# Patient Record
Sex: MALE | Race: OTHER | URBAN - METROPOLITAN AREA
[De-identification: names, ages, dates, MRNs, and addresses within clinical notes are randomized per-mention and may not be internally consistent; named-entity substitution may affect disease eponyms.]

---

## 2024-08-27 ENCOUNTER — INPATIENT (INPATIENT)
Facility: HOSPITAL | Age: 34
LOS: 7 days | Discharge: HOME | DRG: 182 | End: 2024-09-04
Attending: INTERNAL MEDICINE | Admitting: INTERNAL MEDICINE
Payer: MEDICAID

## 2024-08-27 VITALS
OXYGEN SATURATION: 98 % | TEMPERATURE: 98 F | WEIGHT: 214.95 LBS | HEART RATE: 96 BPM | SYSTOLIC BLOOD PRESSURE: 224 MMHG | DIASTOLIC BLOOD PRESSURE: 137 MMHG | RESPIRATION RATE: 16 BRPM

## 2024-08-27 DIAGNOSIS — E66.9 OBESITY, UNSPECIFIED: ICD-10-CM

## 2024-08-27 DIAGNOSIS — N17.9 ACUTE KIDNEY FAILURE, UNSPECIFIED: ICD-10-CM
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
## 2024-08-27 DIAGNOSIS — I13.10 HYPERTENSIVE HEART AND CHRONIC KIDNEY DISEASE WITHOUT HEART FAILURE, WITH STAGE 1 THROUGH STAGE 4 CHRONIC KIDNEY DISEASE, OR UNSPECIFIED CHRONIC KIDNEY DISEASE: ICD-10-CM

## 2024-08-27 DIAGNOSIS — I16.1 HYPERTENSIVE EMERGENCY: ICD-10-CM

## 2024-08-27 DIAGNOSIS — Q61.3 POLYCYSTIC KIDNEY, UNSPECIFIED: ICD-10-CM

## 2024-08-27 DIAGNOSIS — S42.301A UNSPECIFIED FRACTURE OF SHAFT OF HUMERUS, RIGHT ARM, INITIAL ENCOUNTER FOR CLOSED FRACTURE: ICD-10-CM

## 2024-08-27 DIAGNOSIS — Z91.148 PATIENT'S OTHER NONCOMPLIANCE WITH MEDICATION REGIMEN FOR OTHER REASON: ICD-10-CM

## 2024-08-27 DIAGNOSIS — Z78.9 OTHER SPECIFIED HEALTH STATUS: Chronic | ICD-10-CM
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
## 2024-08-27 DIAGNOSIS — R74.01 ELEVATION OF LEVELS OF LIVER TRANSAMINASE LEVELS: ICD-10-CM

## 2024-08-27 DIAGNOSIS — I42.2 OTHER HYPERTROPHIC CARDIOMYOPATHY: ICD-10-CM

## 2024-08-27 LAB
ALBUMIN SERPL ELPH-MCNC: 4.7 G/DL — SIGNIFICANT CHANGE UP (ref 3.5–5.2)
ALP SERPL-CCNC: 147 U/L — HIGH (ref 30–115)
ALT FLD-CCNC: 22 U/L — SIGNIFICANT CHANGE UP (ref 0–41)
ANION GAP SERPL CALC-SCNC: 12 MMOL/L — SIGNIFICANT CHANGE UP (ref 7–14)
APPEARANCE UR: CLEAR — SIGNIFICANT CHANGE UP
AST SERPL-CCNC: 30 U/L — SIGNIFICANT CHANGE UP (ref 0–41)
BASOPHILS # BLD AUTO: 0.04 K/UL — SIGNIFICANT CHANGE UP (ref 0–0.2)
BASOPHILS NFR BLD AUTO: 0.5 % — SIGNIFICANT CHANGE UP (ref 0–1)
BILIRUB SERPL-MCNC: 0.6 MG/DL — SIGNIFICANT CHANGE UP (ref 0.2–1.2)
BILIRUB UR-MCNC: NEGATIVE — SIGNIFICANT CHANGE UP
BUN SERPL-MCNC: 27 MG/DL — HIGH (ref 10–20)
CALCIUM SERPL-MCNC: 9.3 MG/DL — SIGNIFICANT CHANGE UP (ref 8.4–10.4)
CHLORIDE SERPL-SCNC: 104 MMOL/L — SIGNIFICANT CHANGE UP (ref 98–110)
CO2 SERPL-SCNC: 23 MMOL/L — SIGNIFICANT CHANGE UP (ref 17–32)
COLOR SPEC: YELLOW — SIGNIFICANT CHANGE UP
CREAT ?TM UR-MCNC: 46 MG/DL — SIGNIFICANT CHANGE UP
CREAT SERPL-MCNC: 2.5 MG/DL — HIGH (ref 0.7–1.5)
DIFF PNL FLD: NEGATIVE — SIGNIFICANT CHANGE UP
EGFR: 34 ML/MIN/1.73M2 — LOW
EOSINOPHIL # BLD AUTO: 0.13 K/UL — SIGNIFICANT CHANGE UP (ref 0–0.7)
EOSINOPHIL NFR BLD AUTO: 1.6 % — SIGNIFICANT CHANGE UP (ref 0–8)
GLUCOSE BLDC GLUCOMTR-MCNC: 83 MG/DL — SIGNIFICANT CHANGE UP (ref 70–99)
GLUCOSE SERPL-MCNC: 90 MG/DL — SIGNIFICANT CHANGE UP (ref 70–99)
GLUCOSE UR QL: NEGATIVE MG/DL — SIGNIFICANT CHANGE UP
HCT VFR BLD CALC: 48.2 % — SIGNIFICANT CHANGE UP (ref 42–52)
HGB BLD-MCNC: 15.9 G/DL — SIGNIFICANT CHANGE UP (ref 14–18)
IMM GRANULOCYTES NFR BLD AUTO: 0.4 % — HIGH (ref 0.1–0.3)
KETONES UR-MCNC: NEGATIVE MG/DL — SIGNIFICANT CHANGE UP
LEUKOCYTE ESTERASE UR-ACNC: NEGATIVE — SIGNIFICANT CHANGE UP
LYMPHOCYTES # BLD AUTO: 1.74 K/UL — SIGNIFICANT CHANGE UP (ref 1.2–3.4)
LYMPHOCYTES # BLD AUTO: 21.8 % — SIGNIFICANT CHANGE UP (ref 20.5–51.1)
MCHC RBC-ENTMCNC: 30.3 PG — SIGNIFICANT CHANGE UP (ref 27–31)
MCHC RBC-ENTMCNC: 33 G/DL — SIGNIFICANT CHANGE UP (ref 32–37)
MCV RBC AUTO: 92 FL — SIGNIFICANT CHANGE UP (ref 80–94)
MONOCYTES # BLD AUTO: 0.47 K/UL — SIGNIFICANT CHANGE UP (ref 0.1–0.6)
MONOCYTES NFR BLD AUTO: 5.9 % — SIGNIFICANT CHANGE UP (ref 1.7–9.3)
NEUTROPHILS # BLD AUTO: 5.57 K/UL — SIGNIFICANT CHANGE UP (ref 1.4–6.5)
NEUTROPHILS NFR BLD AUTO: 69.8 % — SIGNIFICANT CHANGE UP (ref 42.2–75.2)
NITRITE UR-MCNC: NEGATIVE — SIGNIFICANT CHANGE UP
NRBC # BLD: 0 /100 WBCS — SIGNIFICANT CHANGE UP (ref 0–0)
OSMOLALITY UR: 268 MOS/KG — SIGNIFICANT CHANGE UP (ref 50–1200)
PH UR: 6.5 — SIGNIFICANT CHANGE UP (ref 5–8)
PLATELET # BLD AUTO: 130 K/UL — SIGNIFICANT CHANGE UP (ref 130–400)
PMV BLD: 10.7 FL — HIGH (ref 7.4–10.4)
POTASSIUM SERPL-MCNC: 4.5 MMOL/L — SIGNIFICANT CHANGE UP (ref 3.5–5)
POTASSIUM SERPL-SCNC: 4.5 MMOL/L — SIGNIFICANT CHANGE UP (ref 3.5–5)
POTASSIUM UR-SCNC: 9 MMOL/L — SIGNIFICANT CHANGE UP
PROT ?TM UR-MCNC: 45 MG/DLG/24H — SIGNIFICANT CHANGE UP
PROT SERPL-MCNC: 7.5 G/DL — SIGNIFICANT CHANGE UP (ref 6–8)
PROT UR-MCNC: 100 MG/DL
PROT/CREAT UR-RTO: 1 RATIO — HIGH (ref 0–0.2)
RBC # BLD: 5.24 M/UL — SIGNIFICANT CHANGE UP (ref 4.7–6.1)
RBC # FLD: 12.9 % — SIGNIFICANT CHANGE UP (ref 11.5–14.5)
SODIUM SERPL-SCNC: 139 MMOL/L — SIGNIFICANT CHANGE UP (ref 135–146)
SODIUM UR-SCNC: 69 MMOL/L — SIGNIFICANT CHANGE UP
SP GR SPEC: 1.01 — SIGNIFICANT CHANGE UP (ref 1–1.03)
TROPONIN T, HIGH SENSITIVITY RESULT: 28 NG/L — HIGH (ref 6–21)
TROPONIN T, HIGH SENSITIVITY RESULT: 30 NG/L — HIGH (ref 6–21)
UROBILINOGEN FLD QL: 0.2 MG/DL — SIGNIFICANT CHANGE UP (ref 0.2–1)
UUN UR-MCNC: 263 MG/DL — SIGNIFICANT CHANGE UP
WBC # BLD: 7.98 K/UL — SIGNIFICANT CHANGE UP (ref 4.8–10.8)
WBC # FLD AUTO: 7.98 K/UL — SIGNIFICANT CHANGE UP (ref 4.8–10.8)

## 2024-08-27 PROCEDURE — 80048 BASIC METABOLIC PNL TOTAL CA: CPT

## 2024-08-27 PROCEDURE — 80053 COMPREHEN METABOLIC PANEL: CPT

## 2024-08-27 PROCEDURE — 85027 COMPLETE CBC AUTOMATED: CPT

## 2024-08-27 PROCEDURE — 84300 ASSAY OF URINE SODIUM: CPT

## 2024-08-27 PROCEDURE — 85025 COMPLETE CBC W/AUTO DIFF WBC: CPT

## 2024-08-27 PROCEDURE — 86225 DNA ANTIBODY NATIVE: CPT

## 2024-08-27 PROCEDURE — 83521 IG LIGHT CHAINS FREE EACH: CPT

## 2024-08-27 PROCEDURE — 84133 ASSAY OF URINE POTASSIUM: CPT

## 2024-08-27 PROCEDURE — 82962 GLUCOSE BLOOD TEST: CPT

## 2024-08-27 PROCEDURE — 86708 HEPATITIS A ANTIBODY: CPT

## 2024-08-27 PROCEDURE — 71045 X-RAY EXAM CHEST 1 VIEW: CPT

## 2024-08-27 PROCEDURE — 84484 ASSAY OF TROPONIN QUANT: CPT

## 2024-08-27 PROCEDURE — 81001 URINALYSIS AUTO W/SCOPE: CPT

## 2024-08-27 PROCEDURE — 80074 ACUTE HEPATITIS PANEL: CPT

## 2024-08-27 PROCEDURE — 93307 TTE W/O DOPPLER COMPLETE: CPT

## 2024-08-27 PROCEDURE — 84244 ASSAY OF RENIN: CPT

## 2024-08-27 PROCEDURE — 93010 ELECTROCARDIOGRAM REPORT: CPT

## 2024-08-27 PROCEDURE — 87641 MR-STAPH DNA AMP PROBE: CPT

## 2024-08-27 PROCEDURE — 83935 ASSAY OF URINE OSMOLALITY: CPT

## 2024-08-27 PROCEDURE — 99291 CRITICAL CARE FIRST HOUR: CPT

## 2024-08-27 PROCEDURE — 93005 ELECTROCARDIOGRAM TRACING: CPT

## 2024-08-27 PROCEDURE — 83735 ASSAY OF MAGNESIUM: CPT

## 2024-08-27 PROCEDURE — 80061 LIPID PANEL: CPT

## 2024-08-27 PROCEDURE — 87640 STAPH A DNA AMP PROBE: CPT

## 2024-08-27 PROCEDURE — 86706 HEP B SURFACE ANTIBODY: CPT

## 2024-08-27 PROCEDURE — 84443 ASSAY THYROID STIM HORMONE: CPT

## 2024-08-27 PROCEDURE — 75561 CARDIAC MRI FOR MORPH W/DYE: CPT

## 2024-08-27 PROCEDURE — 86036 ANCA SCREEN EACH ANTIBODY: CPT

## 2024-08-27 PROCEDURE — 86334 IMMUNOFIX E-PHORESIS SERUM: CPT

## 2024-08-27 PROCEDURE — 84100 ASSAY OF PHOSPHORUS: CPT

## 2024-08-27 PROCEDURE — 86704 HEP B CORE ANTIBODY TOTAL: CPT

## 2024-08-27 PROCEDURE — A9579: CPT

## 2024-08-27 PROCEDURE — 93975 VASCULAR STUDY: CPT

## 2024-08-27 PROCEDURE — 71045 X-RAY EXAM CHEST 1 VIEW: CPT | Mod: 26,76

## 2024-08-27 PROCEDURE — 82088 ASSAY OF ALDOSTERONE: CPT

## 2024-08-27 PROCEDURE — 82570 ASSAY OF URINE CREATININE: CPT

## 2024-08-27 PROCEDURE — 82533 TOTAL CORTISOL: CPT

## 2024-08-27 PROCEDURE — 76775 US EXAM ABDO BACK WALL LIM: CPT

## 2024-08-27 PROCEDURE — 80307 DRUG TEST PRSMV CHEM ANLYZR: CPT

## 2024-08-27 PROCEDURE — 83036 HEMOGLOBIN GLYCOSYLATED A1C: CPT

## 2024-08-27 PROCEDURE — 94760 N-INVAS EAR/PLS OXIMETRY 1: CPT

## 2024-08-27 PROCEDURE — 86038 ANTINUCLEAR ANTIBODIES: CPT

## 2024-08-27 PROCEDURE — 84540 ASSAY OF URINE/UREA-N: CPT

## 2024-08-27 PROCEDURE — 83880 ASSAY OF NATRIURETIC PEPTIDE: CPT

## 2024-08-27 PROCEDURE — 86160 COMPLEMENT ANTIGEN: CPT

## 2024-08-27 PROCEDURE — 36415 COLL VENOUS BLD VENIPUNCTURE: CPT

## 2024-08-27 PROCEDURE — 83835 ASSAY OF METANEPHRINES: CPT

## 2024-08-27 PROCEDURE — 84156 ASSAY OF PROTEIN URINE: CPT

## 2024-08-27 RX ORDER — HYDRALAZINE HCL 50 MG
25 TABLET ORAL THREE TIMES A DAY
Refills: 0 | Status: DISCONTINUED | OUTPATIENT
Start: 2024-08-27 | End: 2024-08-28

## 2024-08-27 RX ORDER — PANTOPRAZOLE SODIUM 40 MG
40 TABLET, DELAYED RELEASE (ENTERIC COATED) ORAL
Refills: 0 | Status: DISCONTINUED | OUTPATIENT
Start: 2024-08-27 | End: 2024-08-29

## 2024-08-27 RX ORDER — HYDRALAZINE HCL 50 MG
10 TABLET ORAL ONCE
Refills: 0 | Status: COMPLETED | OUTPATIENT
Start: 2024-08-27 | End: 2024-08-27

## 2024-08-27 RX ORDER — HYDRALAZINE HCL 50 MG
5 TABLET ORAL ONCE
Refills: 0 | Status: DISCONTINUED | OUTPATIENT
Start: 2024-08-27 | End: 2024-08-27

## 2024-08-27 RX ORDER — HEPARIN SODIUM,BOVINE 1000/ML
5000 VIAL (ML) INJECTION EVERY 8 HOURS
Refills: 0 | Status: DISCONTINUED | OUTPATIENT
Start: 2024-08-27 | End: 2024-09-04

## 2024-08-27 RX ORDER — NICARDIPINE HCL 20 MG
5 CAPSULE ORAL
Qty: 40 | Refills: 0 | Status: DISCONTINUED | OUTPATIENT
Start: 2024-08-27 | End: 2024-08-28

## 2024-08-27 RX ADMIN — Medication 10 MILLIGRAM(S): at 15:22

## 2024-08-27 RX ADMIN — Medication 10 MILLIGRAM(S): at 18:59

## 2024-08-27 RX ADMIN — Medication 100 MILLIGRAM(S): at 21:13

## 2024-08-27 RX ADMIN — Medication 25 MILLIGRAM(S): at 21:13

## 2024-08-27 RX ADMIN — Medication 5000 UNIT(S): at 21:12

## 2024-08-27 RX ADMIN — Medication 25 MG/HR: at 17:38

## 2024-08-27 NOTE — ED ADULT NURSE NOTE - NSFALLUNIVINTERV_ED_ALL_ED
Bed/Stretcher in lowest position, wheels locked, appropriate side rails in place/Call bell, personal items and telephone in reach/Instruct patient to call for assistance before getting out of bed/chair/stretcher/Non-slip footwear applied when patient is off stretcher/[place] to call system/Physically safe environment - no spills, clutter or unnecessary equipment/Purposeful proactive rounding/Room/bathroom lighting operational, light cord in reach Regular Contractions

## 2024-08-27 NOTE — ED PROVIDER NOTE - CLINICAL SUMMARY MEDICAL DECISION MAKING FREE TEXT BOX
Pt here with asymptomatic HTN however noted to have Cr 2.5 yesterday (unknown baseline but reportedly did not have kidney problems when he had outpatient labs done 1 yr ago), thus concerning for hypertensive emergency/urgency. Here in ED, BP 220s/130s. No headache or neuro deficits concerning for ICH or PRESS. Plan for labs, ekg, imaging r/o end organ damage, hypertensive emergency/urgency. Labs notable for BUN/Cr 27/2.5, trop 28 with repeat sent off which inpatient team will f/u. Ekg with finding c/w LVH, similar to prior ekg from 2021. ICU consulted who accepts to ICU, requests nicardipine gtt and a-line for tight BP control. Pt requires admission for IV antihypertensives, close BP monitoring, and further management of hypertensive emergency given risk for worsening MARCO, end organ damage, ICH/PRESS, etc if not closely monitored and tx'ed.

## 2024-08-27 NOTE — H&P ADULT - ATTENDING COMMENTS
patient seen and examined agree above note   labetolol 100mg Q8 hrs   hydralazine   taper off nicardipine   renal consult   renal US and bladder , renal arterial doppler   echo   CE   cardiology   lipid profile   MICU

## 2024-08-27 NOTE — ED PROVIDER NOTE - PHYSICAL EXAMINATION
VITAL SIGNS: I have reviewed nursing notes and confirm.  CONSTITUTIONAL: well-appearing, non-toxic, NAD  SKIN: Warm dry  HEAD: NCAT  EYES: EOMI, PERRL  ENT: Moist mucous membranes  NECK: Supple  CARD: RRR, no murmurs, rubs or gallops  RESP: clear to ausculation b/l.  No rales, rhonchi, or wheezing.  ABD: soft, non-tender, non-distended, no rebound or guarding. No CVA tenderness  EXT: Full ROM, no bony tenderness, no pedal edema, no calf tenderness  NEURO: Grossly intact.  PSYCH: Cooperative, appropriate.

## 2024-08-27 NOTE — H&P ADULT - NSHPPHYSICALEXAM_GEN_ALL_CORE
Patient stop at the  to  disability paperwork he drop off 2 months ago. He can be reach at # [phone]. LOS:     VITALS:   T(C): 36.9 (08-27-24 @ 12:41), Max: 36.9 (08-27-24 @ 12:41)  HR: 69 (08-27-24 @ 17:09) (60 - 111)  BP: 255/140 (08-27-24 @ 17:00) (224/132 - 259/148)  RR: 16 (08-27-24 @ 12:41) (16 - 17)  SpO2: 98% (08-27-24 @ 12:41) (98% - 99%)    GENERAL: NAD, lying in bed comfortably  HEAD:  Atraumatic, Normocephalic  EYES: EOMI, PERRLA, conjunctiva and sclera clear  ENT: Moist mucous membranes  NECK: Supple, No JVD  CHEST/LUNG: Clear to auscultation bilaterally; No rales, rhonchi, wheezing, or rubs. Unlabored respirations  HEART: Regular rate and rhythm; No murmurs, rubs, or gallops  ABDOMEN: BSx4; Soft, nontender, nondistended  EXTREMITIES:  2+ Peripheral Pulses, brisk capillary refill. No clubbing, cyanosis, or edema  NERVOUS SYSTEM:  A&Ox3, no focal deficits   SKIN: No rashes or lesions

## 2024-08-27 NOTE — ED PROCEDURE NOTE - CPROC ED ARTER LINE DETAIL1
Using sterile technique, the correct location was identified, and a needle was inserted into the artery (specify in FT)./Positive blood return was obtained via the catheter./Connected to a pressurized flush line./Ultrasound guidance was used.

## 2024-08-27 NOTE — PATIENT PROFILE ADULT - FALL HARM RISK - RISK INTERVENTIONS

## 2024-08-27 NOTE — H&P ADULT - HISTORY OF PRESENT ILLNESS
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     in 2018 was was started on Hyzaar, reports he had elevated BP despite the medication, was started on amlodipine - developed edema, then in 2020 was switched by Dr. Chen to Coreg and Furosemide. Then last time seen by Dr Carson in 2020 for HTN and the plan was to continue same management and add valsartan if BP was still high; but patient was lost to follow up.   Back then Renal Doppler was negative, and reportedly workup forn secondary HTN with Dr. Chen was negative (we don't have them). ECG in 2020 revealed LVH with secondary changes and PVC's.      The patient denies chest pain, dyspnea, orthopnea or PND.   No syncope or falls. No edema.       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   patient had a right humeral fracture and was planned for surgery this Thursday.   On pre-surgery eval, his BP was high so he was sent to the ED.   Yesterday, he came to the ED but then left AMA cause he didn't have his phone with him  He came back today.  He denied any pain, and he walks every day around 1 to 2 miles with no symptoms.   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  Reports that he is on amlodipine 10 mg OD and metoprolol T 50 mg BID. But he didn't take them for the past 3 days.     In 2018 he was started on Hyzaar, reports he had elevated BP despite the medication, was started on amlodipine - developed edema, then in 2020 was switched by Dr. Chen to Coreg and Furosemide. Then last time seen by Dr Carson in 2020 for HTN and the plan was to continue same management and add valsartan if BP was still high; but patient was lost to follow up.   Back then Renal Doppler was negative, and reportedly workup forn secondary HTN with Dr. Chen was negative (we don't have them). ECG in 2020 revealed LVH with secondary changes and PVC's.    Patient reports that he was following with his PCP dr Sharp on [facility]. And she told him previously that his creatinine was high but he didn't follow up with a nephrologist.   he rarely checks his BP at home; and when he does it is usually 180-190.   patient is not in pain from the humeral fracture    The patient denies chest pain, dyspnea, orthopnea or PND.   No syncope or falls. No edema.     No previous TTE on the system    Family Hx:   Father : HTN starting age 55 y.   Siblings: sister and bother younger than him): no BP      In the ED   vitals   · BP  224/ 137 mm Hg  · Heart Rate	96 /min  · Respiration Rate 16 /min  · Temp 36.9 C  · SpO2 	98 %      trop 28   CBC unremarkable   creat 2.6     given in the ed labetalol 10 and started on nicardipine drip

## 2024-08-27 NOTE — ED PROVIDER NOTE - OBJECTIVE STATEMENT
34-year-old male PMH HTN presents to the ED for elevated blood pressures.  Patient states he went for presurgical testing yesterday for right humeral fracture repair and was found to have BP 220s/160s.  Patient was sent to the ED for evaluation, creatinine 2.5 and was offered admission however declined at that time stating he had no way of contacting his family to let them know if he is can be admitted.  States he had outpatient labs done many years ago and was told his creatinine was normal.  Patient has no complaints today.  Denies headache, dizziness, visual changes, chest pain, shortness of breath, diaphoresis, abdominal pain, nausea, vomiting, diarrhea, urinary symptoms, back pain or numbness/tingling in the extremities.  Patient reports he was previously on losartan and metoprolol.  Has been evaluated in the past by cardiology.  Patient reports he ran out of his amlodipine 3 days ago however has been compliant with his metoprolol.

## 2024-08-27 NOTE — ED PROVIDER NOTE - PROGRESS NOTE DETAILS
/126 after labetalol. Troponin 28, EKG shows no ischemic changes, unchanged from baseline. Case discussed with ICU fellow, patient accepted to ICU. Recommends nicardipine drip and A-line. /126 after labetalol. Troponin 28, EKG shows no ischemic changes, unchanged from baseline. Case discussed with ICU fellow, patient accepted to ICU. Recommends nicardipine drip and A-line. Patient endorsed to crit team, will be moved to crit for closer monitoring.

## 2024-08-27 NOTE — ED PROVIDER NOTE - ATTENDING CONTRIBUTION TO CARE
33 yo M with hx of HTN who presents for elevated BP reading. Pt was at [facility] center yesterday for surgery of R humeral fx and noted to have BP 220s/160s. Surgery was canceled and pt was sent to ED. There he had labs showing BUN/Cr 28/2.5 (unknown baseline) so recommended for admission for hypertensive emergency. Pt left AMA as his phone had  and he had no other way of getting home. He returns today because he is amenable to admission. No fever, headache, blurry vision, slurred speech, unilateral weakness, numbness, difficulty walking/swallowing, cp, sob, nausea, vomiting. Was previously on losartan but 2 yrs ago switched to metoprolol 50mg BID and amlodipine 10mg. Was dx'ed with HTN 5-6 yrs ago and has hx of difficult to control BP with baseline ~180s/90s. Had seen cardiology several times due to refractory HTN but has not seen cardiology since covid pandemic. Says he ran out of his amlodipine 3 days ago but still taking metoprolol. He says prior to yesterday, he last had blood drawn >1 yr ago but was never told he had any kidney problems.    PMD Dr. Chen  Cardio Dr. Carson    CONSTITUTIONAL: well developed, nontoxic appearing, in no acute distress, speaking in full sentences  SKIN: warm, dry, no rash, cap refill < 2 seconds  HEENT: normocephalic, atraumatic, no conjunctival erythema, moist mucous membranes, patent airway  NECK: supple  CV:  regular rate, regular rhythm, 2+ radial pulses bilaterally  RESP: no wheezes, no rales, no rhonchi, normal work of breathing  ABD: soft, no tenderness, nondistended, no rebound, no guarding  MSK: no cyanosis, no edema, RUE in plastic cast  NEURO: alert, oriented, grossly unremarkable  PSYCH: cooperative, appropriate    A&P:  Pt here with asymptomatic HTN however noted to have Cr 2.5 yesterday (unknown baseline but reportedly did not have kidney problems when he had outpatient labs done 1 yr ago), thus concerning for hypertensive urgency. Here in ED, BP 220s/130s. No headache or neuro deficits concerning for ICH or PRESS. Plan for labs, ekg, imaging r/o end organ damage, hypertensive emergency/urgency. 35 yo M with hx of HTN who presents for elevated BP reading. Pt was at [facility] center yesterday for surgery of R humeral fx and noted to have BP 220s/160s. Surgery was canceled and pt was sent to ED. There he had labs showing BUN/Cr 28/2.5 (unknown baseline) so recommended for admission for hypertensive emergency. Pt left AMA as his phone had  and he had no other way of getting home. He returns today because he is amenable to admission. No fever, headache, blurry vision, slurred speech, unilateral weakness, numbness, difficulty walking/swallowing, cp, sob, nausea, vomiting. Was previously on losartan but 2 yrs ago switched to metoprolol 50mg BID and amlodipine 10mg. Was dx'ed with HTN 5-6 yrs ago and has hx of difficult to control BP with baseline ~180s/90s. Had seen cardiology several times due to refractory HTN but has not seen cardiology since covid pandemic. Says he ran out of his amlodipine 3 days ago but still taking metoprolol. He says prior to yesterday, he last had blood drawn >1 yr ago but was never told he had any kidney problems.    PMD Dr. Chen  Cardio Dr. Carson    CONSTITUTIONAL: well developed, nontoxic appearing, in no acute distress, speaking in full sentences  SKIN: warm, dry, no rash, cap refill < 2 seconds  HEENT: normocephalic, atraumatic, no conjunctival erythema, moist mucous membranes, patent airway  NECK: supple  CV:  regular rate, regular rhythm, 2+ radial pulses bilaterally  RESP: no wheezes, no rales, no rhonchi, normal work of breathing  ABD: soft, no tenderness, nondistended, no rebound, no guarding  MSK: no cyanosis, no edema, RUE in plastic cast  NEURO: alert, oriented, grossly unremarkable  PSYCH: cooperative, appropriate    A&P:  Pt here with asymptomatic HTN however noted to have Cr 2.5 yesterday (unknown baseline but reportedly did not have kidney problems when he had outpatient labs done 1 yr ago), thus concerning for hypertensive emergency/urgency. Here in ED, BP 220s/130s. No headache or neuro deficits concerning for ICH or PRESS. Plan for labs, ekg, imaging r/o end organ damage, hypertensive emergency/urgency.

## 2024-08-27 NOTE — ED ADULT NURSE NOTE - OBJECTIVE STATEMENT
Aox4 pt presents with fx R arm and presents HTN. IV placed, labs sent per provider order. Denies chest pain or SOB during initial assessment.

## 2024-08-27 NOTE — H&P ADULT - ASSESSMENT
#Diet:   #DVT pro:   #GI pro: pantoprazole  #Activity: as tolerated  #Dispo:   #Med rec:   #Code status:    34 y.o M admitted for hypertensive emergency       IMPRESSION:  # hypertensive emergency (MARCO + elevated trop) in the setting of medication non compliance  # MARCO with proteinuria likely on hypertensive CKD   # Hypertensive heart disease (LVH)    PLAN:    CNS:   - Avoid CNS Depressants     HEENT:   - Oral care.   - Aspiration precautions     PULMONARY:   - HOB @ 45.   - Monitor Pulse Ox. Keep > 93%. Supplement as needed.    CARDIOVASCULAR:   - Daily Weight.   - Strict I&Os. Keep I < O  - nicardipine drip   - labetalol and hydralazine standing   - will not give ACE-i or ARBs given the MARCO  - TTE   - repeat ECG in AM   - regarding the workup of secondary HTN; patient doesn't have the previous records -> will repeat it  f up aldosterone (patient is not on ACE-i or ARBs), renin level, metanephrine urine and serum, TSH  Renal US with duplex renal arteries    GI:   - GI prophylaxis.   - Diet: DASH/TLC    RENAL:   - Creatinine today: 2.5  - Nephrology consult  - Avoid nephrotoxic agents  - Monitor BUN/creatinine  - workup as above     INFECTIOUS DISEASE:   - Monitor WBC  - Trend fever  - no signs or symptoms of infection    HEMATOLOGICAL:   - Monitor H&H  - DVT prophylaxis: heparin SQ    ENDOCRINE:   - Monitor FS  - f up a1c    MUSCULOSKELETAL:   - Ambulate as tolerated   - humeral fracture, needs surgery once optimized cardiac wise    #Med rec: done  #Code status: full    34 y.o M admitted for hypertensive emergency       IMPRESSION:  # hypertensive emergency (MARCO + elevated trop) in the setting of medication non compliance  # Non oliguric MARCO with proteinuria likely on hypertensive CKD   # Hypertensive heart disease (LVH)    PLAN:    CNS:   - Avoid CNS Depressants     HEENT:   - Oral care.   - Aspiration precautions     PULMONARY:   - HOB @ 45.   - Monitor Pulse Ox. Keep > 93%. Supplement as needed.    CARDIOVASCULAR:   - Daily Weight.   - Strict I&Os. Keep I < O  - nicardipine drip   - labetalol and hydralazine standing   - will not give ACE-i or ARBs given the MARCO  - TTE   - repeat ECG in AM   - regarding the workup of secondary HTN; patient doesn't have the previous records -> will repeat it  f up aldosterone (patient is not on ACE-i or ARBs; less likely high jorden as K+ is within normal limits), renin level, metanephrine urine and serum, TSH  Renal US with duplex renal arteries    GI:   - GI prophylaxis.   - Diet: DASH/TLC    RENAL:   - Creatinine today: 2.5  - K+ and bicarb within normal limits   - no joints pain/rigidity ; no skin rash; no family history of CKD/dialysis  - Nephrology consult  - Avoid nephrotoxic agents  - Monitor BUN/creatinine  - workup as above   - no indication for HD now     INFECTIOUS DISEASE:   - Monitor WBC  - Trend fever  - no signs or symptoms of infection    HEMATOLOGICAL:   - Monitor H&H  - DVT prophylaxis: heparin SQ    ENDOCRINE:   - Monitor FS  - f up a1c    MUSCULOSKELETAL:   - Ambulate as tolerated   - humeral fracture, needs surgery once optimized cardiac wise    #Med rec: done  #Code status: full    34 y.o M admitted for hypertensive emergency       IMPRESSION:  # hypertensive emergency (MARCO + elevated trop) in the setting of medication non compliance  # Non oliguric MARCO with proteinuria likely on hypertensive CKD   # Hypertensive heart disease (LVH)  MARCO  PLAN:    CNS:   - Avoid CNS Depressants     HEENT:   - Oral care.   - Aspiration precautions     PULMONARY:   - HOB @ 45.   - Monitor Pulse Ox. Keep > 93%. Supplement as needed.    CARDIOVASCULAR:   - Daily Weight.   - Strict I&Os. Keep I < O  - nicardipine drip   - labetalol and hydralazine standing   - will not give ACE-i or ARBs given the MARCO  - TTE   - repeat ECG in AM   - regarding the workup of secondary HTN; patient doesn't have the previous records -> will repeat it  f up aldosterone (patient is not on ACE-i or ARBs; less likely high jorden as K+ is within normal limits), renin level, metanephrine urine and serum, TSH  Renal US with duplex renal arteries    GI:   - GI prophylaxis.   - Diet: DASH/TLC    RENAL:   - Creatinine today: 2.5  - K+ and bicarb within normal limits   - no joints pain/rigidity ; no skin rash; no family history of CKD/dialysis  - Nephrology consult  - Avoid nephrotoxic agents  - Monitor BUN/creatinine  - workup as above   - no indication for HD now     INFECTIOUS DISEASE:   - Monitor WBC  - Trend fever  - no signs or symptoms of infection    HEMATOLOGICAL:   - Monitor H&H  - DVT prophylaxis: heparin SQ    ENDOCRINE:   - Monitor FS  - f up a1c    MUSCULOSKELETAL:   - Ambulate as tolerated   - humeral fracture, needs surgery once optimized cardiac wise    #Med rec: done  #Code status: full

## 2024-08-27 NOTE — ED ADULT TRIAGE NOTE - CHIEF COMPLAINT QUOTE
pt stated he was here yesterday for evaluation of "fractured R arm" when they found his bp to be high and was told to come back tomorrow. pt also stated his renal function was abnormal. pt has pmhx of htn and is on medication

## 2024-08-27 NOTE — ED ADULT NURSE REASSESSMENT NOTE - NS ED NURSE REASSESS COMMENT FT1
Patient upgraded to critical care area for hypertensive emergency- Cardene IV started, awaiting arterial line placement at this time.

## 2024-08-28 LAB
A1C WITH ESTIMATED AVERAGE GLUCOSE RESULT: 5 % — SIGNIFICANT CHANGE UP (ref 4–5.6)
ALBUMIN SERPL ELPH-MCNC: 4 G/DL — SIGNIFICANT CHANGE UP (ref 3.5–5.2)
ALP SERPL-CCNC: 131 U/L — HIGH (ref 30–115)
ALT FLD-CCNC: 18 U/L — SIGNIFICANT CHANGE UP (ref 0–41)
ANION GAP SERPL CALC-SCNC: 11 MMOL/L — SIGNIFICANT CHANGE UP (ref 7–14)
ANION GAP SERPL CALC-SCNC: 12 MMOL/L — SIGNIFICANT CHANGE UP (ref 7–14)
AST SERPL-CCNC: 13 U/L — SIGNIFICANT CHANGE UP (ref 0–41)
BASOPHILS # BLD AUTO: 0.04 K/UL — SIGNIFICANT CHANGE UP (ref 0–0.2)
BASOPHILS NFR BLD AUTO: 0.5 % — SIGNIFICANT CHANGE UP (ref 0–1)
BILIRUB SERPL-MCNC: 0.5 MG/DL — SIGNIFICANT CHANGE UP (ref 0.2–1.2)
BUN SERPL-MCNC: 23 MG/DL — HIGH (ref 10–20)
BUN SERPL-MCNC: 24 MG/DL — HIGH (ref 10–20)
CALCIUM SERPL-MCNC: 9 MG/DL — SIGNIFICANT CHANGE UP (ref 8.4–10.4)
CALCIUM SERPL-MCNC: 9.1 MG/DL — SIGNIFICANT CHANGE UP (ref 8.4–10.5)
CHLORIDE SERPL-SCNC: 105 MMOL/L — SIGNIFICANT CHANGE UP (ref 98–110)
CHLORIDE SERPL-SCNC: 105 MMOL/L — SIGNIFICANT CHANGE UP (ref 98–110)
CO2 SERPL-SCNC: 22 MMOL/L — SIGNIFICANT CHANGE UP (ref 17–32)
CO2 SERPL-SCNC: 25 MMOL/L — SIGNIFICANT CHANGE UP (ref 17–32)
CREAT SERPL-MCNC: 2.4 MG/DL — HIGH (ref 0.7–1.5)
CREAT SERPL-MCNC: 2.4 MG/DL — HIGH (ref 0.7–1.5)
EGFR: 35 ML/MIN/1.73M2 — LOW
EGFR: 35 ML/MIN/1.73M2 — LOW
EOSINOPHIL # BLD AUTO: 0.21 K/UL — SIGNIFICANT CHANGE UP (ref 0–0.7)
EOSINOPHIL NFR BLD AUTO: 2.4 % — SIGNIFICANT CHANGE UP (ref 0–8)
ESTIMATED AVERAGE GLUCOSE: 97 MG/DL — SIGNIFICANT CHANGE UP (ref 68–114)
GLUCOSE SERPL-MCNC: 103 MG/DL — HIGH (ref 70–99)
GLUCOSE SERPL-MCNC: 105 MG/DL — HIGH (ref 70–99)
HCT VFR BLD CALC: 46.1 % — SIGNIFICANT CHANGE UP (ref 42–52)
HGB BLD-MCNC: 15.1 G/DL — SIGNIFICANT CHANGE UP (ref 14–18)
IMM GRANULOCYTES NFR BLD AUTO: 0.3 % — SIGNIFICANT CHANGE UP (ref 0.1–0.3)
LYMPHOCYTES # BLD AUTO: 2.28 K/UL — SIGNIFICANT CHANGE UP (ref 1.2–3.4)
LYMPHOCYTES # BLD AUTO: 26.1 % — SIGNIFICANT CHANGE UP (ref 20.5–51.1)
MAGNESIUM SERPL-MCNC: 1.9 MG/DL — SIGNIFICANT CHANGE UP (ref 1.8–2.4)
MCHC RBC-ENTMCNC: 30.3 PG — SIGNIFICANT CHANGE UP (ref 27–31)
MCHC RBC-ENTMCNC: 32.8 G/DL — SIGNIFICANT CHANGE UP (ref 32–37)
MCV RBC AUTO: 92.6 FL — SIGNIFICANT CHANGE UP (ref 80–94)
MONOCYTES # BLD AUTO: 0.51 K/UL — SIGNIFICANT CHANGE UP (ref 0.1–0.6)
MONOCYTES NFR BLD AUTO: 5.8 % — SIGNIFICANT CHANGE UP (ref 1.7–9.3)
MRSA PCR RESULT.: NEGATIVE — SIGNIFICANT CHANGE UP
NEUTROPHILS # BLD AUTO: 5.68 K/UL — SIGNIFICANT CHANGE UP (ref 1.4–6.5)
NEUTROPHILS NFR BLD AUTO: 64.9 % — SIGNIFICANT CHANGE UP (ref 42.2–75.2)
NRBC # BLD: 0 /100 WBCS — SIGNIFICANT CHANGE UP (ref 0–0)
NT-PROBNP SERPL-SCNC: 1534 PG/ML — HIGH (ref 0–300)
PHOSPHATE SERPL-MCNC: 4 MG/DL — SIGNIFICANT CHANGE UP (ref 2.1–4.9)
PLATELET # BLD AUTO: 143 K/UL — SIGNIFICANT CHANGE UP (ref 130–400)
PMV BLD: 10.3 FL — SIGNIFICANT CHANGE UP (ref 7.4–10.4)
POTASSIUM SERPL-MCNC: 3 MMOL/L — LOW (ref 3.5–5)
POTASSIUM SERPL-MCNC: 3.6 MMOL/L — SIGNIFICANT CHANGE UP (ref 3.5–5)
POTASSIUM SERPL-SCNC: 3 MMOL/L — LOW (ref 3.5–5)
POTASSIUM SERPL-SCNC: 3.6 MMOL/L — SIGNIFICANT CHANGE UP (ref 3.5–5)
PROT SERPL-MCNC: 6.3 G/DL — SIGNIFICANT CHANGE UP (ref 6–8)
RBC # BLD: 4.98 M/UL — SIGNIFICANT CHANGE UP (ref 4.7–6.1)
RBC # FLD: 13 % — SIGNIFICANT CHANGE UP (ref 11.5–14.5)
SODIUM SERPL-SCNC: 138 MMOL/L — SIGNIFICANT CHANGE UP (ref 135–146)
SODIUM SERPL-SCNC: 142 MMOL/L — SIGNIFICANT CHANGE UP (ref 135–146)
TSH SERPL-MCNC: 3.99 UIU/ML — SIGNIFICANT CHANGE UP (ref 0.27–4.2)
WBC # BLD: 8.75 K/UL — SIGNIFICANT CHANGE UP (ref 4.8–10.8)
WBC # FLD AUTO: 8.75 K/UL — SIGNIFICANT CHANGE UP (ref 4.8–10.8)

## 2024-08-28 PROCEDURE — 93306 TTE W/DOPPLER COMPLETE: CPT | Mod: 26

## 2024-08-28 PROCEDURE — 99223 1ST HOSP IP/OBS HIGH 75: CPT

## 2024-08-28 RX ORDER — ACETAMINOPHEN 325 MG/1
650 TABLET ORAL EVERY 6 HOURS
Refills: 0 | Status: DISCONTINUED | OUTPATIENT
Start: 2024-08-28 | End: 2024-09-02

## 2024-08-28 RX ORDER — NICARDIPINE HCL 20 MG
5 CAPSULE ORAL
Qty: 40 | Refills: 0 | Status: DISCONTINUED | OUTPATIENT
Start: 2024-08-28 | End: 2024-08-30

## 2024-08-28 RX ORDER — HYDRALAZINE HCL 50 MG
50 TABLET ORAL EVERY 8 HOURS
Refills: 0 | Status: DISCONTINUED | OUTPATIENT
Start: 2024-08-28 | End: 2024-08-30

## 2024-08-28 RX ORDER — POTASSIUM CHLORIDE 10 MEQ
40 TABLET, EXT RELEASE, PARTICLES/CRYSTALS ORAL ONCE
Refills: 0 | Status: DISCONTINUED | OUTPATIENT
Start: 2024-08-28 | End: 2024-08-28

## 2024-08-28 RX ORDER — HYDRALAZINE HCL 50 MG
25 TABLET ORAL ONCE
Refills: 0 | Status: COMPLETED | OUTPATIENT
Start: 2024-08-28 | End: 2024-08-28

## 2024-08-28 RX ORDER — CHLORHEXIDINE GLUCONATE 40 MG/ML
1 SOLUTION TOPICAL DAILY
Refills: 0 | Status: DISCONTINUED | OUTPATIENT
Start: 2024-08-28 | End: 2024-09-04

## 2024-08-28 RX ORDER — ACETAMINOPHEN 325 MG/1
1000 TABLET ORAL ONCE
Refills: 0 | Status: COMPLETED | OUTPATIENT
Start: 2024-08-28 | End: 2024-08-28

## 2024-08-28 RX ORDER — POTASSIUM CHLORIDE 10 MEQ
20 TABLET, EXT RELEASE, PARTICLES/CRYSTALS ORAL
Refills: 0 | Status: COMPLETED | OUTPATIENT
Start: 2024-08-28 | End: 2024-08-28

## 2024-08-28 RX ADMIN — CHLORHEXIDINE GLUCONATE 1 APPLICATION(S): 40 SOLUTION TOPICAL at 12:07

## 2024-08-28 RX ADMIN — Medication 25 MILLIGRAM(S): at 12:03

## 2024-08-28 RX ADMIN — Medication 50 MILLIGRAM(S): at 21:07

## 2024-08-28 RX ADMIN — ACETAMINOPHEN 400 MILLIGRAM(S): 325 TABLET ORAL at 12:53

## 2024-08-28 RX ADMIN — Medication 100 MILLIGRAM(S): at 05:10

## 2024-08-28 RX ADMIN — Medication 5000 UNIT(S): at 13:38

## 2024-08-28 RX ADMIN — Medication 50 MILLIEQUIVALENT(S): at 09:25

## 2024-08-28 RX ADMIN — Medication 25 MILLIGRAM(S): at 05:10

## 2024-08-28 RX ADMIN — Medication 50 MILLIEQUIVALENT(S): at 06:48

## 2024-08-28 RX ADMIN — Medication 100 MILLIGRAM(S): at 12:04

## 2024-08-28 RX ADMIN — Medication 200 MILLIGRAM(S): at 21:07

## 2024-08-28 RX ADMIN — Medication 5000 UNIT(S): at 05:10

## 2024-08-28 RX ADMIN — Medication 40 MILLIGRAM(S): at 06:30

## 2024-08-28 RX ADMIN — ACETAMINOPHEN 1000 MILLIGRAM(S): 325 TABLET ORAL at 13:32

## 2024-08-28 RX ADMIN — Medication 5000 UNIT(S): at 21:07

## 2024-08-28 RX ADMIN — Medication 50 MILLIEQUIVALENT(S): at 12:02

## 2024-08-28 NOTE — CONSULT NOTE ADULT - ASSESSMENT
33 y/o male with PMH of early onset HTN (since the age of 26), LVH, admitted for elevated BP.  patient had a right humeral fracture and was planned for surgery on 8/29. On pre-surgery eval, his BP was high so he was sent to the ED. Reports that he is on amlodipine 10 mg OD and metoprolol T 50 mg BID but ran out of amlodipine a few days prior to presentation. currently admitted to MICU for hypertensive emergency.   cr 2.4 stable since admission   please reach out to PCP to get baseline cr / might need to send w/up   need to repeat w/up secondary HTN : sono/ doppler renal arteries/ serum metanephrines/ cortisol / renin jorden   on nicardipine drip/ labetalol   increase hydralazine  to 50 q 8 / if remains elevated might need to add a diuretics   pr/ cr ratio 0.9/ will repeat   ph at goal   cardiology notes appreciated   will follow     
33 y/o male with PMH of early onset HTN, LVH, possible CKD admitted to MICU for hypertensive emergency.     #Hypertensive emergency   #Severe concentric left ventricular hypertrophy   #CKD? unknown baseline   - continue nicardipine infusion for a goal SBP of 150, add oral medications and up titrate as needed   - TTE suspicious for hypertrophic CM vs infiltrative disease. Recommend cardiac MRI   - work up for secondary HTN   - obtain records from patient's PCP to see baseline Cr   - nephrology evaluation for ?CKD  - would need outpt follow up with his cardiologist on discharge

## 2024-08-28 NOTE — PROGRESS NOTE ADULT - ASSESSMENT
34 y.o M admitted for hypertensive emergency       IMPRESSION:  # hypertensive emergency (MARCO + elevated trop) in the setting of medication non compliance  # Non oliguric MARCO with proteinuria likely on hypertensive CKD   # Hypertensive heart disease (LVH)  #MARCO      PLAN:    CNS:   - Avoid CNS Depressants     HEENT:   - Oral care.   - Aspiration precautions     PULMONARY:   - HOB @ 45.   - Monitor Pulse Ox. Keep > 93%. Supplement as needed.    CARDIOVASCULAR:   - Daily Weight.   - Strict I&Os. Keep I < O  - nicardipine drip   - c/w labetalol 100mg tid and hydralazine 25mg tid  - will not give ACE-i or ARBs given the MARCO  - TTE   - repeat ECG in AM   - regarding the workup of secondary HTN; patient doesn't have the previous records -> will repeat it  f up aldosterone (patient is not on ACE-i or ARBs; less likely high jorden as K+ is within normal limits), renin level, metanephrine urine and serum, TSH  Renal US with duplex renal arteries  - taper nicardipine drips  - goal -160    GI:   - GI prophylaxis.   - Diet: DASH/TLC    RENAL:   - Creatinine today: 2.5  - K+ and bicarb within normal limits   - no joints pain/rigidity ; no skin rash; no family history of CKD/dialysis  - Nephrology consult  - Avoid nephrotoxic agents  - Monitor BUN/creatinine  - workup as above   - no indication for HD now     INFECTIOUS DISEASE:   - Monitor WBC  - Trend fever  - no signs or symptoms of infection    HEMATOLOGICAL:   - Monitor H&H  - DVT prophylaxis: heparin SQ    ENDOCRINE:   - Monitor FS  - f up a1c    MUSCULOSKELETAL:   - Ambulate as tolerated   - humeral fracture, needs surgery once optimized cardiac wise    #Med rec: done  #Code status: full

## 2024-08-28 NOTE — PROGRESS NOTE ADULT - SUBJECTIVE AND OBJECTIVE BOX
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         In the ED   vitals   · BP  224/ 137 mm Hg  · Heart Rate	96 /min  · Respiration Rate 16 /min  · Temp 36.9 C  · SpO2 	98 %      trop 28   CBC unremarkable   creat 2.6     given in the ed labetalol 10 and started on nicardipine drip  (27 Aug 2024 17:18)       INTERVAL HPI/OVERNIGHT EVENTS:   No overnight events   Afebrile, hemodynamically stable     Subjective:    ICU Vital Signs Last 24 Hrs  T(C): 36.2 (28 Aug 2024 05:00), Max: 36.9 (27 Aug 2024 12:41)  T(F): 97.2 (28 Aug 2024 05:00), Max: 98.4 (27 Aug 2024 12:41)  HR: 76 (28 Aug 2024 07:00) (69 - 96)  BP: 193/91 (27 Aug 2024 18:00) (193/91 - 259/148)  BP(mean): 131 (27 Aug 2024 18:00) (131 - 131)  ABP: 144/68 (28 Aug 2024 07:00) (137/60 - 255/123)  ABP(mean): 87 (28 Aug 2024 07:00) (79 - 124)  RR: 16 (28 Aug 2024 07:00) (14 - 26)  SpO2: 97% (28 Aug 2024 07:00) (97% - 100%)    O2 Parameters below as of 28 Aug 2024 06:00  Patient On (Oxygen Delivery Method): room air          I&O's Summary    27 Aug 2024 07:01  -  28 Aug 2024 07:00  --------------------------------------------------------  IN: 985 mL / OUT: 1850 mL / NET: -865 mL          Daily Height in cm: 182.88 (27 Aug 2024 18:00)    Daily Weight in k.1 (28 Aug 2024 05:00)    Adult Advanced Hemodynamics Last 24 Hrs  CVP(mm Hg): --  CVP(cm H2O): --  CO: --  CI: --  PA: --  PA(mean): --  PCWP: --  SVR: --  SVRI: --  PVR: --  PVRI: --    EKG/Telemetry Events:    MEDICATIONS  (STANDING):  chlorhexidine 2% Cloths 1 Application(s) Topical daily  heparin   Injectable 5000 Unit(s) SubCutaneous every 8 hours  hydrALAZINE 25 milliGRAM(s) Oral three times a day  labetalol 100 milliGRAM(s) Oral three times a day  niCARdipine Infusion 5 mG/Hr (25 mL/Hr) IV Continuous <Continuous>  pantoprazole    Tablet 40 milliGRAM(s) Oral before breakfast  potassium chloride  20 mEq/100 mL IVPB 20 milliEquivalent(s) IV Intermittent every 2 hours    MEDICATIONS  (PRN):      PHYSICAL EXAM:  GENERAL:   HEAD:  Atraumatic, Normocephalic  EYES: EOMI, PERRLA, conjunctiva and sclera clear  NECK: Supple, No JVD, Normal thyroid, no enlarged nodes  NERVOUS SYSTEM:  Alert & Awake.   CHEST/LUNG: B/L good air entry; No rales, rhonchi, or wheezing  HEART: S1S2 normal, no S3, Regular rate and rhythm; No murmurs  ABDOMEN: Soft, Nontender, Nondistended; Bowel sounds present  EXTREMITIES:  2+ Peripheral Pulses, No clubbing, cyanosis, or edema  LYMPH: No lymphadenopathy noted  SKIN: No rashes or lesions    LABS:                        15.1   8.75  )-----------( 143      ( 28 Aug 2024 04:55 )             46.1         142  |  105  |  24<H>  ----------------------------<  105<H>  3.0<L>   |  25  |  2.4<H>    Ca    9.0      28 Aug 2024 04:55  Phos  4.0       Mg     1.9         TPro  6.3  /  Alb  4.0  /  TBili  0.5  /  DBili  x   /  AST  13  /  ALT  18  /  AlkPhos  131<H>      LIVER FUNCTIONS - ( 28 Aug 2024 04:55 )  Alb: 4.0 g/dL / Pro: 6.3 g/dL / ALK PHOS: 131 U/L / ALT: 18 U/L / AST: 13 U/L / GGT: x             CAPILLARY BLOOD GLUCOSE      POCT Blood Glucose.: 83 mg/dL (27 Aug 2024 18:13)            Urinalysis Basic - ( 28 Aug 2024 04:55 )    Color: x / Appearance: x / SG: x / pH: x  Gluc: 105 mg/dL / Ketone: x  / Bili: x / Urobili: x   Blood: x / Protein: x / Nitrite: x   Leuk Esterase: x / RBC: x / WBC x   Sq Epi: x / Non Sq Epi: x / Bacteria: x          RADIOLOGY & ADDITIONAL TESTS:  CXR:        Care Discussed with Consultants/Other Providers [ x] YES  [ ] NO

## 2024-08-28 NOTE — CONSULT NOTE ADULT - SUBJECTIVE AND OBJECTIVE BOX
Brief HPI:  35 y/o male with PMH of early onset severe HTN (since the age of 26), hypertensive heart disease, LVH, admitted for elevated BP.   patient had a right humeral fracture and was planned for surgery this Thursday.   On pre-surgery eval, his BP was high so he was sent to the ED.   Yesterday, he came to the ED but then left AMA cause he didn't have his phone with him  He came back today.  He denied any pain, and he walks every day around 1 to 2 miles with no symptoms.   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  Reports that he is on amlodipine 10 mg OD and metoprolol T 50 mg BID. But he didn't take them for the past 3 days.     In 2018 he was started on Hyzaar, reports he had elevated BP despite the medication, was started on amlodipine - developed edema, then in 2020 was switched by Dr. Chen to Coreg and Furosemide. Then last time seen by Dr Carson in 2020 for HTN and the plan was to continue same management and add valsartan if BP was still high; but patient was lost to follow up.   Back then Renal Doppler was negative, and reportedly workup forn secondary HTN with Dr. Chen was negative (we don't have them). ECG in 2020 revealed LVH with secondary changes and PVC's.    Patient reports that he was following with his PCP dr Sharp on [facility]. And she told him previously that his creatinine was high but he didn't follow up with a nephrologist.   he rarely checks his BP at home; and when he does it is usually 180-190.   patient is not in pain from the humeral fracture    The patient denies chest pain, dyspnea, orthopnea or PND.   No syncope or falls. No edema.     No previous TTE on the system    Family Hx:   Father : HTN starting age 55 y.   Siblings: sister and bother younger than him): no BP      In the ED   vitals   · BP  224/ 137 mm Hg  · Heart Rate	96 /min  · Respiration Rate 16 /min  · Temp 36.9 C  · SpO2 	98 %      trop 28   CBC unremarkable   creat 2.6     given in the ed labetalol 10 and started on nicardipine drip  (27 Aug 2024 17:18)      PAST MEDICAL & SURGICAL HISTORY  HTN (hypertension)    Humeral fracture    Known health problems: none        FAMILY HISTORY:  FAMILY HISTORY:  FH: HTN (hypertension) (Father)        SOCIAL HISTORY:  []smoker  []Alcohol  []Drug    ALLERGIES:  No Known Allergies      MEDICATIONS:  MEDICATIONS  (STANDING):  chlorhexidine 2% Cloths 1 Application(s) Topical daily  heparin   Injectable 5000 Unit(s) SubCutaneous every 8 hours  hydrALAZINE 25 milliGRAM(s) Oral once  hydrALAZINE 25 milliGRAM(s) Oral three times a day  labetalol 100 milliGRAM(s) Oral three times a day  niCARdipine Infusion 5 mG/Hr (25 mL/Hr) IV Continuous <Continuous>  pantoprazole    Tablet 40 milliGRAM(s) Oral before breakfast  potassium chloride  20 mEq/100 mL IVPB 20 milliEquivalent(s) IV Intermittent every 2 hours    MEDICATIONS  (PRN):      HOME MEDICATIONS:  Home Medications:  amLODIPine 10 mg oral tablet: 1 tab(s) orally once a day (26 Aug 2024 18:48)  metoprolol succinate 50 mg oral tablet, extended release: 1 tab(s) orally 2 times a day (26 Aug 2024 18:48)      VITALS:   T(F): 97.2 (08-28 @ 05:00), Max: 98.4 (08-27 @ 12:41)  HR: 82 (08-28 @ 09:00) (60 - 111)  BP: 193/91 (08-27 @ 18:00) (193/91 - 259/148)  BP(mean): 131 (08-27 @ 18:00) (131 - 180)  RR: 16 (08-28 @ 09:00) (14 - 26)  SpO2: 97% (08-28 @ 09:00) (97% - 100%)    I&O's Summary    27 Aug 2024 07:01  -  28 Aug 2024 07:00  --------------------------------------------------------  IN: 993 mL / OUT: 1850 mL / NET: -857 mL    28 Aug 2024 07:01  -  28 Aug 2024 10:39  --------------------------------------------------------  IN: 125 mL / OUT: 0 mL / NET: 125 mL        REVIEW OF SYSTEMS:  CONSTITUTIONAL: No weakness, fevers or chills  EYES: No visual changes  ENT: No vertigo or throat pain   NECK: No pain or stiffness  RESPIRATORY: No cough, wheezing, hemoptysis; No shortness of breath  CARDIOVASCULAR: See HPI  GASTROINTESTINAL: No abdominal or epigastric pain. No nausea, vomiting, or hematemesis; No diarrhea or constipation. No melena or hematochezia.  GENITOURINARY: No dysuria, frequency or hematuria  NEUROLOGICAL: No numbness or weakness  SKIN: No itching, no rashes  MSK: no    PHYSICAL EXAM:  NEURO: patient is awake, alert and oriented  GEN: Not in acute distress  NECK: no thyroid enlargement, no JVD  LUNGS: Clear to auscultation bilaterally   CARDIOVASCULAR: S1/S2 present, RRR, no murmurs or rubs, no carotid bruits,  + PP bilaterally  ABD: Soft, non-tender, non-distended, +BS  EXT: No MARTINA  SKIN: Intact    LABS:                        15.1   8.75  )-----------( 143      ( 28 Aug 2024 04:55 )             46.1     08-28    142  |  105  |  24<H>  ----------------------------<  105<H>  3.0<L>   |  25  |  2.4<H>    Ca    9.0      28 Aug 2024 04:55  Phos  4.0     08-28  Mg     1.9     08-28    TPro  6.3  /  Alb  4.0  /  TBili  0.5  /  DBili  x   /  AST  13  /  ALT  18  /  AlkPhos  131<H>  08-28              Troponin trend:            RADIOLOGY:  -CXR:  -TTE:  -CCTA:  -STRESS TEST:  -CATHETERIZATION:    ECG:    TELEMETRY EVENTS:   Brief HPI:  35 y/o male with PMH of early onset HTN (since the age of 26), LVH, admitted for elevated BP.   patient had a right humeral fracture and was planned for surgery on . On pre-surgery eval, his BP was high so he was sent to the ED. Reports that he is on amlodipine 10 mg OD and metoprolol T 50 mg BID but ran out of amlodipine a few days prior to presentation.   In  he was started on Hyzaar, reports he had elevated BP despite the medication, was started on amlodipine - developed edema, then in  was switched by Dr. Chen to Coreg and Furosemide. Then last time seen by Dr Carson in  for HTN and the plan was to continue same management and add valsartan if BP was still high; but patient was lost to follow up.   Back then Renal Doppler was negative, and reportedly workup for secondary HTN with Dr. Chen was negative (we don't have them). ECG in  revealed LVH with secondary changes and PVC's.  Patient reports that he was following with his PCP dr Sharp on [facility]. And she told him previously that his creatinine was high but he didn't follow up with a nephrologist.   he rarely checks his BP at home; and when he does it is usually 180-190.   patient is not in pain from the humeral fracture  The patient denies chest pain, dyspnea, orthopnea or PND.   No syncope or falls. No edema.   Currently admitted to MICU for hypertensive emergency.     PAST MEDICAL & SURGICAL HISTORY  HTN (hypertension)    Humeral fracture    Known health problems: none        FAMILY HISTORY:  FAMILY HISTORY:  FH: HTN (hypertension) (Father)      ALLERGIES:  No Known Allergies      MEDICATIONS:  MEDICATIONS  (STANDING):  chlorhexidine 2% Cloths 1 Application(s) Topical daily  heparin   Injectable 5000 Unit(s) SubCutaneous every 8 hours  hydrALAZINE 25 milliGRAM(s) Oral once  hydrALAZINE 25 milliGRAM(s) Oral three times a day  labetalol 100 milliGRAM(s) Oral three times a day  niCARdipine Infusion 5 mG/Hr (25 mL/Hr) IV Continuous <Continuous>  pantoprazole    Tablet 40 milliGRAM(s) Oral before breakfast  potassium chloride  20 mEq/100 mL IVPB 20 milliEquivalent(s) IV Intermittent every 2 hours    MEDICATIONS  (PRN):      HOME MEDICATIONS:  Home Medications:  amLODIPine 10 mg oral tablet: 1 tab(s) orally once a day (26 Aug 2024 18:48)  metoprolol succinate 50 mg oral tablet, extended release: 1 tab(s) orally 2 times a day (26 Aug 2024 18:48)      VITALS:   T(F): 97.2 ( @ 05:00), Max: 98.4 ( @ 12:41)  HR: 82 ( @ 09:00) (60 - 111)  BP: 193/91 ( @ 18:00) (193/91 - 259/148)  BP(mean): 131 ( @ 18:00) (131 - 180)  RR: 16 ( @ 09:00) (14 - 26)  SpO2: 97% ( @ 09:00) (97% - 100%)    I&O's Summary    27 Aug 2024 07:  -  28 Aug 2024 07:00  --------------------------------------------------------  IN: 993 mL / OUT: 1850 mL / NET: -857 mL    28 Aug 2024 07:  -  28 Aug 2024 10:39  --------------------------------------------------------  IN: 125 mL / OUT: 0 mL / NET: 125 mL        REVIEW OF SYSTEMS:  CONSTITUTIONAL: No weakness, fevers or chills  EYES: No visual changes  ENT: No vertigo or throat pain   NECK: No pain or stiffness  RESPIRATORY: No cough, wheezing, hemoptysis; No shortness of breath  CARDIOVASCULAR: See HPI  GASTROINTESTINAL: No abdominal or epigastric pain. No nausea, vomiting, or hematemesis; No diarrhea or constipation. No melena or hematochezia.  GENITOURINARY: No dysuria, frequency or hematuria  NEUROLOGICAL: No numbness or weakness  SKIN: No itching, no rashes  MSK: no    PHYSICAL EXAM:  NEURO: patient is awake, alert and oriented  GEN: Not in acute distress  NECK: no thyroid enlargement, no JVD  LUNGS: Clear to auscultation bilaterally   CARDIOVASCULAR: S1/S2 present, RRR, no murmurs   ABD: Soft, non-tender, non-distended, +BS  EXT: No MARTINA    LABS:                        15.1   8.75  )-----------( 143      ( 28 Aug 2024 04:55 )             46.1         142  |  105  |  24<H>  ----------------------------<  105<H>  3.0<L>   |  25  |  2.4<H>    Ca    9.0      28 Aug 2024 04:55  Phos  4.0       Mg     1.9         TPro  6.3  /  Alb  4.0  /  TBili  0.5  /  DBili  x   /  AST  13  /  ALT  18  /  AlkPhos  131<H>          RADIOLOGY:  -CXR:  -TTE: < from: TTE Echo Complete w/ Contrast w/o Doppler (24 @ 07:40) >   1. Increased global left ventricular systolic function.   2. Left ventricular ejection fraction, by visual estimation, is >75%.   3. Severe concentric left ventricular hypertrophy with max LV wall   thickness of 22 mm. This is likely consistent with hypertrophic   cardiomyopathy vs infiltrative cardiomyopathy. Recommend cardiac MRI to   further evaluate.   4. Spectral Doppler shows impaired relaxation pattern of left   ventricular myocardial filling (Grade I diastolic dysfunction).   5. Normal right ventricular size and function.   6. No hemodynamically significant valvular regurgitation or stenosis.   7. Adequate TR velocity was not obtained to accurately assess RVSP.   8. Endocardial visualization was enhanced with intravenous echo contrast.    -CCTA:  -STRESS TEST:  -CATHETERIZATION:    EC/26/24: LVH and repolarization abnormality

## 2024-08-28 NOTE — CONSULT NOTE ADULT - SUBJECTIVE AND OBJECTIVE BOX
35 y/o male with PMH of early onset HTN (since the age of 26), LVH, admitted for elevated BP.  patient had a right humeral fracture and was planned for surgery on 8/29. On pre-surgery eval, his BP was high so he was sent to the ED. Reports that he is on amlodipine 10 mg OD and metoprolol T 50 mg BID but ran out of amlodipine a few days prior to presentation. currently admitted to MICU for hypertensive emergency.         PAST HISTORY  --------------------------------------------------------------------------------  PAST MEDICAL & SURGICAL HISTORY:  HTN (hypertension)      Humeral fracture      Known health problems: none        FAMILY HISTORY:  FH: HTN (hypertension) (Father)      PAST SOCIAL HISTORY:    ALLERGIES & MEDICATIONS  --------------------------------------------------------------------------------  Allergies    No Known Allergies    Intolerances      Standing Inpatient Medications  chlorhexidine 2% Cloths 1 Application(s) Topical daily  heparin   Injectable 5000 Unit(s) SubCutaneous every 8 hours  hydrALAZINE 25 milliGRAM(s) Oral three times a day  labetalol 200 milliGRAM(s) Oral three times a day  niCARdipine Infusion 5 mG/Hr IV Continuous <Continuous>  pantoprazole    Tablet 40 milliGRAM(s) Oral before breakfast    PRN Inpatient Medications  acetaminophen     Tablet .. 650 milliGRAM(s) Oral every 6 hours PRN          VITALS/PHYSICAL EXAM  --------------------------------------------------------------------------------  T(C): 36.2 (08-28-24 @ 12:00), Max: 36.5 (08-27-24 @ 18:00)  HR: 84 (08-28-24 @ 13:00) (69 - 94)  BP: 193/91 (08-27-24 @ 18:00) (193/91 - 259/148)  RR: 21 (08-28-24 @ 13:00) (14 - 26)  SpO2: 98% (08-28-24 @ 13:00) (97% - 100%)  Wt(kg): --  Height (cm): 182.9 (08-27-24 @ 18:00)  Weight (kg): 95.4 (08-27-24 @ 18:00)  BMI (kg/m2): 28.5 (08-27-24 @ 18:00)  BSA (m2): 2.18 (08-27-24 @ 18:00)      08-27-24 @ 07:01  -  08-28-24 @ 07:00  --------------------------------------------------------  IN: 993 mL / OUT: 1850 mL / NET: -857 mL    08-28-24 @ 07:01  -  08-28-24 @ 16:07  --------------------------------------------------------  IN: 755 mL / OUT: 0 mL / NET: 755 mL      Physical Exam:  	Gen: NAD  	Pulm: CTA B/L  	CV:  S1S2; no rub  	Abd:  soft, nontender/nondistended  	LE:  no edema  	    LABS/STUDIES  --------------------------------------------------------------------------------              15.1   8.75  >-----------<  143      [08-28-24 @ 04:55]              46.1     138  |  105  |  23  ----------------------------<  103      [08-28-24 @ 12:12]  3.6   |  22  |  2.4        Ca     9.1     [08-28-24 @ 12:12]      Mg     1.9     [08-28-24 @ 04:55]      Phos  4.0     [08-28-24 @ 04:55]    TPro  6.3  /  Alb  4.0  /  TBili  0.5  /  DBili  x   /  AST  13  /  ALT  18  /  AlkPhos  131  [08-28-24 @ 04:55]      Creatinine Trend:  SCr 2.4 [08-28 @ 12:12]  SCr 2.4 [08-28 @ 04:55]  SCr 2.5 [08-27 @ 14:31]  SCr 2.5 [08-26 @ 18:06]    Urinalysis - [08-28-24 @ 12:12]      Color  / Appearance  / SG  / pH       Gluc 103 / Ketone   / Bili  / Urobili        Blood  / Protein  / Leuk Est  / Nitrite       RBC  / WBC  / Hyaline  / Gran  / Sq Epi  / Non Sq Epi  / Bacteria     Urine Creatinine 46      [08-27-24 @ 20:13]  Urine Protein 45      [08-27-24 @ 20:13]  Urine Sodium 69.0      [08-27-24 @ 20:13]  Urine Urea Nitrogen 263      [08-27-24 @ 20:13]  Urine Potassium 9      [08-27-24 @ 20:13]  Urine Osmolality 268      [08-27-24 @ 20:13]    TSH 3.99      [08-28-24 @ 04:55]

## 2024-08-29 LAB
ALBUMIN SERPL ELPH-MCNC: 3.8 G/DL — SIGNIFICANT CHANGE UP (ref 3.5–5.2)
ALDOST SERPL-MCNC: 10.8 NG/DL — SIGNIFICANT CHANGE UP
ALDOST SERPL-MCNC: 9.8 NG/DL — SIGNIFICANT CHANGE UP
ALDOSTERONE / DIRECT RENIN RATIO RESULT: 0.7 RATIO — SIGNIFICANT CHANGE UP
ALP SERPL-CCNC: 115 U/L — SIGNIFICANT CHANGE UP (ref 30–115)
ALT FLD-CCNC: 15 U/L — SIGNIFICANT CHANGE UP (ref 0–41)
AMPHET UR-MCNC: NEGATIVE NG/ML — SIGNIFICANT CHANGE UP
ANION GAP SERPL CALC-SCNC: 10 MMOL/L — SIGNIFICANT CHANGE UP (ref 7–14)
APPEARANCE UR: CLEAR — SIGNIFICANT CHANGE UP
AST SERPL-CCNC: 11 U/L — SIGNIFICANT CHANGE UP (ref 0–41)
BARBITURATES UR QL SCN: NEGATIVE NG/ML — SIGNIFICANT CHANGE UP
BARBITURATES UR-MCNC: NEGATIVE NG/ML — SIGNIFICANT CHANGE UP
BENZODIAZ UR-MCNC: NEGATIVE NG/ML — SIGNIFICANT CHANGE UP
BILIRUB SERPL-MCNC: 0.5 MG/DL — SIGNIFICANT CHANGE UP (ref 0.2–1.2)
BILIRUB UR-MCNC: NEGATIVE — SIGNIFICANT CHANGE UP
BUN SERPL-MCNC: 23 MG/DL — HIGH (ref 10–20)
CALCIUM SERPL-MCNC: 8.5 MG/DL — SIGNIFICANT CHANGE UP (ref 8.4–10.4)
CHLORIDE SERPL-SCNC: 108 MMOL/L — SIGNIFICANT CHANGE UP (ref 98–110)
CHOLEST SERPL-MCNC: 179 MG/DL — SIGNIFICANT CHANGE UP
CO2 SERPL-SCNC: 24 MMOL/L — SIGNIFICANT CHANGE UP (ref 17–32)
COCAINE METAB.OTHER UR-MCNC: NEGATIVE NG/ML — SIGNIFICANT CHANGE UP
COLOR SPEC: YELLOW — SIGNIFICANT CHANGE UP
CREAT ?TM UR-MCNC: 65 MG/DL — SIGNIFICANT CHANGE UP
CREAT SERPL-MCNC: 2.5 MG/DL — HIGH (ref 0.7–1.5)
CREATININE, URINE THERAPEUTIC: 42.8 MG/DL — SIGNIFICANT CHANGE UP (ref 20–300)
DIFF PNL FLD: NEGATIVE — SIGNIFICANT CHANGE UP
EGFR: 34 ML/MIN/1.73M2 — LOW
FENTANYL UR QL SCN: NEGATIVE NG/ML — SIGNIFICANT CHANGE UP
GLUCOSE SERPL-MCNC: 95 MG/DL — SIGNIFICANT CHANGE UP (ref 70–99)
GLUCOSE UR QL: 100 MG/DL
HCT VFR BLD CALC: 42 % — SIGNIFICANT CHANGE UP (ref 42–52)
HDLC SERPL-MCNC: 46 MG/DL — SIGNIFICANT CHANGE UP
HGB BLD-MCNC: 13.9 G/DL — LOW (ref 14–18)
KETONES UR-MCNC: NEGATIVE MG/DL — SIGNIFICANT CHANGE UP
LEUKOCYTE ESTERASE UR-ACNC: NEGATIVE — SIGNIFICANT CHANGE UP
LIPID PNL WITH DIRECT LDL SERPL: 102 MG/DL — HIGH
MAGNESIUM SERPL-MCNC: 1.8 MG/DL — SIGNIFICANT CHANGE UP (ref 1.8–2.4)
MCHC RBC-ENTMCNC: 30.4 PG — SIGNIFICANT CHANGE UP (ref 27–31)
MCHC RBC-ENTMCNC: 33.1 G/DL — SIGNIFICANT CHANGE UP (ref 32–37)
MCV RBC AUTO: 91.9 FL — SIGNIFICANT CHANGE UP (ref 80–94)
METHADONE UR QL SCN: NEGATIVE NG/ML — SIGNIFICANT CHANGE UP
NITRITE UR-MCNC: NEGATIVE — SIGNIFICANT CHANGE UP
NON HDL CHOLESTEROL: 133 MG/DL — HIGH
NRBC # BLD: 0 /100 WBCS — SIGNIFICANT CHANGE UP (ref 0–0)
OPIATES UR-MCNC: NEGATIVE NG/ML — SIGNIFICANT CHANGE UP
OXYCODONE UR QL SCN: NEGATIVE NG/ML — SIGNIFICANT CHANGE UP
PCP UR-MCNC: NEGATIVE NG/ML — SIGNIFICANT CHANGE UP
PH UR: 6 — SIGNIFICANT CHANGE UP (ref 5–8)
PH, URINE RESULT: 5.6 — SIGNIFICANT CHANGE UP (ref 4.5–8.9)
PLATELET # BLD AUTO: 143 K/UL — SIGNIFICANT CHANGE UP (ref 130–400)
PMV BLD: 10.3 FL — SIGNIFICANT CHANGE UP (ref 7.4–10.4)
POTASSIUM SERPL-MCNC: 3.3 MMOL/L — LOW (ref 3.5–5)
POTASSIUM SERPL-SCNC: 3.3 MMOL/L — LOW (ref 3.5–5)
PROT ?TM UR-MCNC: 23 MG/DLG/24H — SIGNIFICANT CHANGE UP
PROT SERPL-MCNC: 5.9 G/DL — LOW (ref 6–8)
PROT UR-MCNC: 30 MG/DL
PROT/CREAT UR-RTO: 0.4 RATIO — HIGH (ref 0–0.2)
RBC # BLD: 4.57 M/UL — LOW (ref 4.7–6.1)
RBC # FLD: 13.2 % — SIGNIFICANT CHANGE UP (ref 11.5–14.5)
RENIN DIRECT, PLASMA: 16 PG/ML — SIGNIFICANT CHANGE UP
SODIUM SERPL-SCNC: 142 MMOL/L — SIGNIFICANT CHANGE UP (ref 135–146)
SP GR SPEC: 1.01 — SIGNIFICANT CHANGE UP (ref 1–1.03)
THC UR QL: NEGATIVE NG/ML — SIGNIFICANT CHANGE UP
TRIGL SERPL-MCNC: 157 MG/DL — HIGH
UROBILINOGEN FLD QL: 0.2 MG/DL — SIGNIFICANT CHANGE UP (ref 0.2–1)
WBC # BLD: 8.83 K/UL — SIGNIFICANT CHANGE UP (ref 4.8–10.8)
WBC # FLD AUTO: 8.83 K/UL — SIGNIFICANT CHANGE UP (ref 4.8–10.8)

## 2024-08-29 PROCEDURE — 99233 SBSQ HOSP IP/OBS HIGH 50: CPT

## 2024-08-29 RX ORDER — NIFEDIPINE 60 MG/1
30 TABLET, FILM COATED, EXTENDED RELEASE ORAL DAILY
Refills: 0 | Status: DISCONTINUED | OUTPATIENT
Start: 2024-08-29 | End: 2024-08-30

## 2024-08-29 RX ORDER — POTASSIUM CHLORIDE 10 MEQ
40 TABLET, EXT RELEASE, PARTICLES/CRYSTALS ORAL ONCE
Refills: 0 | Status: COMPLETED | OUTPATIENT
Start: 2024-08-29 | End: 2024-08-29

## 2024-08-29 RX ORDER — POTASSIUM CHLORIDE 10 MEQ
20 TABLET, EXT RELEASE, PARTICLES/CRYSTALS ORAL ONCE
Refills: 0 | Status: COMPLETED | OUTPATIENT
Start: 2024-08-29 | End: 2024-08-29

## 2024-08-29 RX ADMIN — Medication 50 MILLIGRAM(S): at 21:41

## 2024-08-29 RX ADMIN — ACETAMINOPHEN 650 MILLIGRAM(S): 325 TABLET ORAL at 10:23

## 2024-08-29 RX ADMIN — Medication 50 MILLIGRAM(S): at 05:50

## 2024-08-29 RX ADMIN — Medication 40 MILLIGRAM(S): at 06:15

## 2024-08-29 RX ADMIN — Medication 25 MG/HR: at 18:59

## 2024-08-29 RX ADMIN — Medication 25 MG/HR: at 21:49

## 2024-08-29 RX ADMIN — ACETAMINOPHEN 650 MILLIGRAM(S): 325 TABLET ORAL at 11:02

## 2024-08-29 RX ADMIN — Medication 40 MILLIEQUIVALENT(S): at 08:23

## 2024-08-29 RX ADMIN — Medication 50 MILLIGRAM(S): at 14:10

## 2024-08-29 RX ADMIN — Medication 5000 UNIT(S): at 14:09

## 2024-08-29 RX ADMIN — Medication 5000 UNIT(S): at 05:51

## 2024-08-29 RX ADMIN — Medication 40 MILLIEQUIVALENT(S): at 06:15

## 2024-08-29 RX ADMIN — Medication 25 MG/HR: at 10:21

## 2024-08-29 RX ADMIN — Medication 25 MG/HR: at 14:17

## 2024-08-29 RX ADMIN — Medication 5000 UNIT(S): at 21:41

## 2024-08-29 RX ADMIN — Medication 200 MILLIGRAM(S): at 14:10

## 2024-08-29 RX ADMIN — Medication 200 MILLIGRAM(S): at 05:50

## 2024-08-29 RX ADMIN — NIFEDIPINE 30 MILLIGRAM(S): 60 TABLET, FILM COATED, EXTENDED RELEASE ORAL at 08:23

## 2024-08-29 RX ADMIN — Medication 50 MILLIEQUIVALENT(S): at 06:15

## 2024-08-29 RX ADMIN — Medication 200 MILLIGRAM(S): at 21:41

## 2024-08-29 NOTE — PROGRESS NOTE ADULT - SUBJECTIVE AND OBJECTIVE BOX
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         In the ED   vitals   · BP  224/ 137 mm Hg  · Heart Rate	96 /min  · Respiration Rate 16 /min  · Temp 36.9 C  · SpO2 	98 %      trop 28   CBC unremarkable   creat 2.6     given in the ed labetalol 10 and started on nicardipine drip  (27 Aug 2024 17:18)       INTERVAL HPI/OVERNIGHT EVENTS:   No overnight events   Afebrile, hemodynamically stable     Subjective:    ICU Vital Signs Last 24 Hrs  T(C): 36.2 (28 Aug 2024 05:00), Max: 36.9 (27 Aug 2024 12:41)  T(F): 97.2 (28 Aug 2024 05:00), Max: 98.4 (27 Aug 2024 12:41)  HR: 76 (28 Aug 2024 07:00) (69 - 96)  BP: 193/91 (27 Aug 2024 18:00) (193/91 - 259/148)  BP(mean): 131 (27 Aug 2024 18:00) (131 - 131)  ABP: 144/68 (28 Aug 2024 07:00) (137/60 - 255/123)  ABP(mean): 87 (28 Aug 2024 07:00) (79 - 124)  RR: 16 (28 Aug 2024 07:00) (14 - 26)  SpO2: 97% (28 Aug 2024 07:00) (97% - 100%)    O2 Parameters below as of 28 Aug 2024 06:00  Patient On (Oxygen Delivery Method): room air          I&O's Summary    27 Aug 2024 07:01  -  28 Aug 2024 07:00  --------------------------------------------------------  IN: 985 mL / OUT: 1850 mL / NET: -865 mL          Daily Height in cm: 182.88 (27 Aug 2024 18:00)    Daily Weight in k.1 (28 Aug 2024 05:00)    Adult Advanced Hemodynamics Last 24 Hrs  CVP(mm Hg): --  CVP(cm H2O): --  CO: --  CI: --  PA: --  PA(mean): --  PCWP: --  SVR: --  SVRI: --  PVR: --  PVRI: --    EKG/Telemetry Events:    MEDICATIONS  (STANDING):  chlorhexidine 2% Cloths 1 Application(s) Topical daily  heparin   Injectable 5000 Unit(s) SubCutaneous every 8 hours  hydrALAZINE 25 milliGRAM(s) Oral three times a day  labetalol 100 milliGRAM(s) Oral three times a day  niCARdipine Infusion 5 mG/Hr (25 mL/Hr) IV Continuous <Continuous>  pantoprazole    Tablet 40 milliGRAM(s) Oral before breakfast  potassium chloride  20 mEq/100 mL IVPB 20 milliEquivalent(s) IV Intermittent every 2 hours    MEDICATIONS  (PRN):      PHYSICAL EXAM:  GENERAL:   HEAD:  Atraumatic, Normocephalic  EYES: EOMI, PERRLA, conjunctiva and sclera clear  NECK: Supple, No JVD, Normal thyroid, no enlarged nodes  NERVOUS SYSTEM:  Alert & Awake.   CHEST/LUNG: B/L good air entry; No rales, rhonchi, or wheezing  HEART: S1S2 normal, no S3, Regular rate and rhythm; No murmurs  ABDOMEN: Soft, Nontender, Nondistended; Bowel sounds present  EXTREMITIES:  2+ Peripheral Pulses, No clubbing, cyanosis, or edema  LYMPH: No lymphadenopathy noted  SKIN: No rashes or lesions    LABS:                        15.1   8.75  )-----------( 143      ( 28 Aug 2024 04:55 )             46.1         142  |  105  |  24<H>  ----------------------------<  105<H>  3.0<L>   |  25  |  2.4<H>    Ca    9.0      28 Aug 2024 04:55  Phos  4.0       Mg     1.9         TPro  6.3  /  Alb  4.0  /  TBili  0.5  /  DBili  x   /  AST  13  /  ALT  18  /  AlkPhos  131<H>      LIVER FUNCTIONS - ( 28 Aug 2024 04:55 )  Alb: 4.0 g/dL / Pro: 6.3 g/dL / ALK PHOS: 131 U/L / ALT: 18 U/L / AST: 13 U/L / GGT: x             CAPILLARY BLOOD GLUCOSE      POCT Blood Glucose.: 83 mg/dL (27 Aug 2024 18:13)            Urinalysis Basic - ( 28 Aug 2024 04:55 )    Color: x / Appearance: x / SG: x / pH: x  Gluc: 105 mg/dL / Ketone: x  / Bili: x / Urobili: x   Blood: x / Protein: x / Nitrite: x   Leuk Esterase: x / RBC: x / WBC x   Sq Epi: x / Non Sq Epi: x / Bacteria: x          RADIOLOGY & ADDITIONAL TESTS:  CXR:        Care Discussed with Consultants/Other Providers [ x] YES  [ ] NO

## 2024-08-29 NOTE — PROGRESS NOTE ADULT - ASSESSMENT
34 y.o M admitted for hypertensive emergency       IMPRESSION:  # hypertensive emergency (MARCO + elevated trop) in the setting of medication non compliance  # Non oliguric MARCO with proteinuria likely on hypertensive CKD   # Hypertensive heart disease (LVH)  MARCO  PLAN:    CNS:   - Avoid CNS Depressants     HEENT:   - Oral care.   - Aspiration precautions     PULMONARY:   - HOB @ 45.   - Monitor Pulse Ox. Keep > 93%. Supplement as needed.    CARDIOVASCULAR:   - Daily Weight.   - Strict I&Os. Keep I < O  - nicardipine drip taper down  target  i f still elevated start procardia   - labetalol and hydralazine standing   - will not give ACE-i or ARBs given the MARCO  - TTE   - repeat ECG in AM   - regarding the workup of secondary HTN; patient doesn't have the previous records -> will repeat it  f up aldosterone (patient is not on ACE-i or ARBs; less likely high jorden as K+ is within normal limits), renin level, metanephrine urine and serum, TSH  Renal US with duplex renal arteries    GI:   - GI prophylaxis.   - Diet: DASH/TLC    RENAL:   - Creatinine today: 2.5  - K+ and bicarb within normal limits   - no joints pain/rigidity ; no skin rash; no family history of CKD/dialysis  - Nephrology consult  - Avoid nephrotoxic agents  - Monitor BUN/creatinine  - workup as above   - no indication for HD now     INFECTIOUS DISEASE:   - Monitor WBC  - Trend fever  - no signs or symptoms of infection    HEMATOLOGICAL:   - Monitor H&H  - DVT prophylaxis: heparin SQ    ENDOCRINE:   - Monitor FS  - f up a1c    MUSCULOSKELETAL:   - Ambulate as tolerated   - humeral fracture, needs surgery once optimized cardiac wise    #Med rec: done  #Code status: full    34 y.o M admitted for hypertensive emergency       IMPRESSION:  # hypertensive emergency (MARCO + elevated trop) in the setting of medication non compliance  # Non oliguric MARCO with proteinuria likely on hypertensive CKD   # Hypertensive heart disease (LVH)  MARCO  PLAN:    CNS:   - Avoid CNS Depressants     HEENT:   - Oral care.   - Aspiration precautions     PULMONARY:   - HOB @ 45.   - Monitor Pulse Ox. Keep > 93%. Supplement as needed.    CARDIOVASCULAR:   - Daily Weight.   - Strict I&Os. Keep I < O  - nicardipine drip taper down  target     start procardia 30   - labetalol and hydralazine standing   cardiac MRI f  follow cardiology     GI:   - GI prophylaxis.   - Diet: DASH/TLC    RENAL:   - Creatinine today: 2.5  - K+ and bicarb within normal limits   - no joints pain/rigidity ; no skin rash; no family history of CKD/dialysis  - Nephrology consult  - Avoid nephrotoxic agents  - Monitor BUN/creatinine  - workup as above   - no indication for HD now   follow renal recommendation   INFECTIOUS DISEASE:   - Monitor WBC  - Trend fever  - no signs or symptoms of infection    HEMATOLOGICAL:   - Monitor H&H  - DVT prophylaxis: heparin SQ    ENDOCRINE:   - Monitor FS  - f up a1c    MUSCULOSKELETAL:   - Ambulate as tolerated   - humeral fracture, needs surgery once optimized cardiac wise    #Med rec: done  #Code status: full

## 2024-08-29 NOTE — PROGRESS NOTE ADULT - SUBJECTIVE AND OBJECTIVE BOX
Patient is a 34y old  Male who presents with a chief complaint of     Over Night Events:  Patient seen and examined.   on nocardi[pine drip   echo reviewed   need cardiac MRI     ROS:  See HPI    PHYSICAL EXAM    ICU Vital Signs Last 24 Hrs  T(C): 36.7 (29 Aug 2024 05:00), Max: 36.7 (29 Aug 2024 05:00)  T(F): 98 (29 Aug 2024 05:00), Max: 98 (29 Aug 2024 05:00)  HR: 85 (29 Aug 2024 06:00) (74 - 90)  BP: --  BP(mean): --  ABP: 155/68 (29 Aug 2024 06:00) (126/62 - 176/86)  ABP(mean): 93 (29 Aug 2024 06:00) (78 - 109)  RR: 14 (29 Aug 2024 06:00) (13 - 27)  SpO2: 100% (29 Aug 2024 05:00) (96% - 100%)    O2 Parameters below as of 29 Aug 2024 05:00  Patient On (Oxygen Delivery Method): room air            General: awake   HEENT:     sharan         Lymph Nodes: NO cervical LN   Lungs: Bilateral BS  Cardiovascular: Regular   Abdomen: Soft, Positive BS  Extremities: No clubbing   Skin: warm   Neurological: no focal   Musculoskeletal: move all ext     I&O's Detail    28 Aug 2024 07:01  -  29 Aug 2024 07:00  --------------------------------------------------------  IN:    IV PiggyBack: 200 mL    NiCARdipine: 1310 mL    Oral Fluid: 960 mL  Total IN: 2470 mL    OUT:    Voided (mL): 1600 mL  Total OUT: 1600 mL    Total NET: 870 mL          LABS:                          13.9   8.83  )-----------( 143      ( 29 Aug 2024 04:45 )             42.0         29 Aug 2024 04:45    142    |  108    |  23     ----------------------------<  95     3.3     |  24     |  2.5      Ca    8.5        29 Aug 2024 04:45  Phos  4.0       28 Aug 2024 04:55  Mg     1.8       29 Aug 2024 04:45    TPro  5.9    /  Alb  3.8    /  TBili  0.5    /  DBili  x      /  AST  11     /  ALT  15     /  AlkPhos  115    29 Aug 2024 04:45  Amylase x     lipase x                                                                                        Urinalysis Basic - ( 29 Aug 2024 04:45 )    Color: x / Appearance: x / SG: x / pH: x  Gluc: 95 mg/dL / Ketone: x  / Bili: x / Urobili: x   Blood: x / Protein: x / Nitrite: x   Leuk Esterase: x / RBC: x / WBC x   Sq Epi: x / Non Sq Epi: x / Bacteria: x                                                                                                                                                     MEDICATIONS  (STANDING):  chlorhexidine 2% Cloths 1 Application(s) Topical daily  heparin   Injectable 5000 Unit(s) SubCutaneous every 8 hours  hydrALAZINE 50 milliGRAM(s) Oral every 8 hours  labetalol 200 milliGRAM(s) Oral three times a day  niCARdipine Infusion 5 mG/Hr (25 mL/Hr) IV Continuous <Continuous>  pantoprazole    Tablet 40 milliGRAM(s) Oral before breakfast    MEDICATIONS  (PRN):  acetaminophen     Tablet .. 650 milliGRAM(s) Oral every 6 hours PRN Moderate Pain (4 - 6)          Xrays:  TLC:  OG:  ET tube:                                                                                       ECHO:  [facility] ICU:

## 2024-08-29 NOTE — PROGRESS NOTE ADULT - ASSESSMENT
35 y/o male with PMH of early onset HTN (since the age of 26), LVH, admitted for elevated BP.  patient had a right humeral fracture and was planned for surgery on 8/29. On pre-surgery eval, his BP was high so he was sent to the ED. Reports that he is on amlodipine 10 mg OD and metoprolol T 50 mg BID but ran out of amlodipine a few days prior to presentation. currently admitted to MICU for hypertensive emergency.   cr 2.4 stable since admission   please reach out to PCP to get baseline cr   need to repeat w/up secondary HTN : sono/ doppler renal arteries/ serum metanephrines/ cortisol / renin jorden    start tapering nicardipine  down and start nifedipine xl 30 / continue labetabol and hydralazine/ might need to add a diuretic    pr/ cr ratio 0.9/ will repeat   ph at goal   check KRISTA/ DNA/ C3/ C4/ ANCA/ hepatitis profile / IF/ K/L   cardiology notes appreciated   will follow

## 2024-08-29 NOTE — PROGRESS NOTE ADULT - SUBJECTIVE AND OBJECTIVE BOX
seen and examined  24 hevents noted   on nicradipine drip   bp improving         PAST HISTORY  --------------------------------------------------------------------------------  No significant changes to PMH, PSH, FHx, SHx, unless otherwise noted    ALLERGIES & MEDICATIONS  --------------------------------------------------------------------------------  Allergies    No Known Allergies    Intolerances      Standing Inpatient Medications  chlorhexidine 2% Cloths 1 Application(s) Topical daily  heparin   Injectable 5000 Unit(s) SubCutaneous every 8 hours  hydrALAZINE 50 milliGRAM(s) Oral every 8 hours  labetalol 200 milliGRAM(s) Oral three times a day  niCARdipine Infusion 5 mG/Hr IV Continuous <Continuous>  pantoprazole    Tablet 40 milliGRAM(s) Oral before breakfast    PRN Inpatient Medications  acetaminophen     Tablet .. 650 milliGRAM(s) Oral every 6 hours PRN          VITALS/PHYSICAL EXAM  --------------------------------------------------------------------------------  T(C): 36.7 (08-29-24 @ 05:00), Max: 36.7 (08-29-24 @ 05:00)  HR: 85 (08-29-24 @ 06:00) (74 - 90)  BP: --  RR: 14 (08-29-24 @ 06:00) (13 - 27)  SpO2: 100% (08-29-24 @ 05:00) (96% - 100%)  Wt(kg): --  Height (cm): 182.9 (08-27-24 @ 18:00)  Weight (kg): 95.4 (08-27-24 @ 18:00)  BMI (kg/m2): 28.5 (08-27-24 @ 18:00)  BSA (m2): 2.18 (08-27-24 @ 18:00)      08-28-24 @ 07:01  -  08-29-24 @ 07:00  --------------------------------------------------------  IN: 2470 mL / OUT: 1600 mL / NET: 870 mL      Physical Exam:  	Gen: NAD  	Pulm: CTA B/L  	CV:  S1S2; no rub  	Abd:  soft, nontender/nondistended  	LE: no edema      LABS/STUDIES  --------------------------------------------------------------------------------              13.9   8.83  >-----------<  143      [08-29-24 @ 04:45]              42.0     142  |  108  |  23  ----------------------------<  95      [08-29-24 @ 04:45]  3.3   |  24  |  2.5        Ca     8.5     [08-29-24 @ 04:45]      Mg     1.8     [08-29-24 @ 04:45]      Phos  4.0     [08-28-24 @ 04:55]    TPro  5.9  /  Alb  3.8  /  TBili  0.5  /  DBili  x   /  AST  11  /  ALT  15  /  AlkPhos  115  [08-29-24 @ 04:45]      Creatinine Trend:  SCr 2.5 [08-29 @ 04:45]  SCr 2.4 [08-28 @ 12:12]  SCr 2.4 [08-28 @ 04:55]  SCr 2.5 [08-27 @ 14:31]  SCr 2.5 [08-26 @ 18:06]    Urinalysis - [08-29-24 @ 04:45]      Color  / Appearance  / SG  / pH       Gluc 95 / Ketone   / Bili  / Urobili        Blood  / Protein  / Leuk Est  / Nitrite       RBC  / WBC  / Hyaline  / Gran  / Sq Epi  / Non Sq Epi  / Bacteria     Urine Creatinine 46      [08-27-24 @ 20:13]  Urine Protein 45      [08-27-24 @ 20:13]  Urine Sodium 69.0      [08-27-24 @ 20:13]  Urine Urea Nitrogen 263      [08-27-24 @ 20:13]  Urine Potassium 9      [08-27-24 @ 20:13]  Urine Osmolality 268      [08-27-24 @ 20:13]    TSH 3.99      [08-28-24 @ 04:55]  Lipid: chol 179, , HDL 46, LDL --      [08-29-24 @ 04:45]

## 2024-08-29 NOTE — PROGRESS NOTE ADULT - ASSESSMENT
34 y.o M admitted for hypertensive emergency       IMPRESSION:  # hypertensive emergency (MARCO + elevated trop) in the setting of medication non compliance  # Non oliguric MARCO with proteinuria likely on hypertensive CKD   # Hypertensive heart disease (LVH)  #MARCO      PLAN:    CNS:   - Avoid CNS Depressants     HEENT:   - Oral care.   - Aspiration precautions     PULMONARY:   - HOB @ 45.   - Monitor Pulse Ox. Keep > 93%. Supplement as needed.    CARDIOVASCULAR:   - Daily Weight.   - Strict I&Os. Keep I < O  - nicardipine drip   - c/w labetalol 100mg tid and hydralazine 25mg tid  - will not give ACE-i or ARBs given the MARCO  - TTE   - repeat ECG in AM   - regarding the workup of secondary HTN; patient doesn't have the previous records -> will repeat it  f up aldosterone (patient is not on ACE-i or ARBs; less likely high jorden as K+ is within normal limits), renin level, metanephrine urine and serum, TSH  Renal US with duplex renal arteries  - taper nicardipine drips  - goal -160    GI:   - GI prophylaxis.   - Diet: DASH/TLC    RENAL:   - Creatinine today: 2.5  - K+ and bicarb within normal limits   - no joints pain/rigidity ; no skin rash; no family history of CKD/dialysis  - Nephrology consult  - Avoid nephrotoxic agents  - Monitor BUN/creatinine  - workup as above   - no indication for HD now     INFECTIOUS DISEASE:   - Monitor WBC  - Trend fever  - no signs or symptoms of infection    HEMATOLOGICAL:   - Monitor H&H  - DVT prophylaxis: heparin SQ    ENDOCRINE:   - Monitor FS  - f up a1c    MUSCULOSKELETAL:   - Ambulate as tolerated   - humeral fracture, needs surgery once optimized cardiac wise    Spoke with Palliative and the granddaughter- she is refusing feeds and NG tube. Palliative will follow up with her.    #Med rec: done  #Code status: full      34 y.o M admitted for hypertensive emergency       IMPRESSION:  # hypertensive emergency (MARCO + elevated trop) in the setting of medication non compliance  # Non oliguric MARCO with proteinuria likely on hypertensive CKD   # Hypertensive heart disease (LVH)  #MARCO      PLAN:    CNS:   - Avoid CNS Depressants     HEENT:   - Oral care.   - Aspiration precautions     PULMONARY:   - HOB @ 45.   - Monitor Pulse Ox. Keep > 93%. Supplement as needed.    CARDIOVASCULAR:   - Daily Weight.   - Strict I&Os. Keep I < O  - nicardipine drip   - c/w labetalol 100mg tid and hydralazine 25mg tid  - will not give ACE-i or ARBs given the MARCO  - TTE   - repeat ECG in AM   - regarding the workup of secondary HTN; patient doesn't have the previous records -> will repeat it  f up aldosterone (patient is not on ACE-i or ARBs; less likely high jorden as K+ is within normal limits), renin level, metanephrine urine and serum, TSH  Renal US with duplex renal arteries  - taper nicardipine drips  - goal -160  -echo showed infiltrative cardiomyopathy- pending cardiac MRI    GI:   - GI prophylaxis.   - Diet: DASH/TLC    RENAL:   - Creatinine today: 2.5  - K+ and bicarb within normal limits   - no joints pain/rigidity ; no skin rash; no family history of CKD/dialysis  - Nephrology consult  - Avoid nephrotoxic agents  - Monitor BUN/creatinine  - workup as above   - no indication for HD now   -nephro adjusted hydralazine, and cardio started Procardia    INFECTIOUS DISEASE:   - Monitor WBC  - Trend fever  - no signs or symptoms of infection    HEMATOLOGICAL:   - Monitor H&H  - DVT prophylaxis: heparin SQ    ENDOCRINE:   - Monitor FS  - f up a1c    MUSCULOSKELETAL:   - Ambulate as tolerated   - humeral fracture, needs surgery once optimized cardiac wise    f/u cortisol level at 8 am tomorrow and call PCP Eulogio    #Med rec: done  #Code status: full

## 2024-08-30 LAB
ALBUMIN SERPL ELPH-MCNC: 4.2 G/DL — SIGNIFICANT CHANGE UP (ref 3.5–5.2)
ALDOST SERPL-MCNC: 24.4 NG/DL — HIGH
ALP SERPL-CCNC: 126 U/L — HIGH (ref 30–115)
ALT FLD-CCNC: 16 U/L — SIGNIFICANT CHANGE UP (ref 0–41)
ANION GAP SERPL CALC-SCNC: 13 MMOL/L — SIGNIFICANT CHANGE UP (ref 7–14)
AST SERPL-CCNC: 12 U/L — SIGNIFICANT CHANGE UP (ref 0–41)
BASOPHILS # BLD AUTO: 0.05 K/UL — SIGNIFICANT CHANGE UP (ref 0–0.2)
BASOPHILS NFR BLD AUTO: 0.5 % — SIGNIFICANT CHANGE UP (ref 0–1)
BILIRUB SERPL-MCNC: 0.7 MG/DL — SIGNIFICANT CHANGE UP (ref 0.2–1.2)
BUN SERPL-MCNC: 25 MG/DL — HIGH (ref 10–20)
C3 SERPL-MCNC: 112 MG/DL — SIGNIFICANT CHANGE UP (ref 81–157)
C4 SERPL-MCNC: 22 MG/DL — SIGNIFICANT CHANGE UP (ref 13–39)
CALCIUM SERPL-MCNC: 9.2 MG/DL — SIGNIFICANT CHANGE UP (ref 8.4–10.5)
CHLORIDE SERPL-SCNC: 102 MMOL/L — SIGNIFICANT CHANGE UP (ref 98–110)
CO2 SERPL-SCNC: 22 MMOL/L — SIGNIFICANT CHANGE UP (ref 17–32)
CORTIS SERPL-MCNC: 16.3 UG/DL — SIGNIFICANT CHANGE UP
CREAT SERPL-MCNC: 2.3 MG/DL — HIGH (ref 0.7–1.5)
EGFR: 37 ML/MIN/1.73M2 — LOW
EOSINOPHIL # BLD AUTO: 0.21 K/UL — SIGNIFICANT CHANGE UP (ref 0–0.7)
EOSINOPHIL NFR BLD AUTO: 2.3 % — SIGNIFICANT CHANGE UP (ref 0–8)
GLUCOSE SERPL-MCNC: 105 MG/DL — HIGH (ref 70–99)
HCT VFR BLD CALC: 44.7 % — SIGNIFICANT CHANGE UP (ref 42–52)
HGB BLD-MCNC: 14.6 G/DL — SIGNIFICANT CHANGE UP (ref 14–18)
IMM GRANULOCYTES NFR BLD AUTO: 0.2 % — SIGNIFICANT CHANGE UP (ref 0.1–0.3)
KAPPA LC SER QL IFE: 4.87 MG/DL — HIGH (ref 0.33–1.94)
KAPPA/LAMBDA FREE LIGHT CHAIN RATIO, SERUM: 2.16 RATIO — HIGH (ref 0.26–1.65)
LAMBDA LC SER QL IFE: 2.25 MG/DL — SIGNIFICANT CHANGE UP (ref 0.57–2.63)
LYMPHOCYTES # BLD AUTO: 2.67 K/UL — SIGNIFICANT CHANGE UP (ref 1.2–3.4)
LYMPHOCYTES # BLD AUTO: 29.3 % — SIGNIFICANT CHANGE UP (ref 20.5–51.1)
MAGNESIUM SERPL-MCNC: 1.8 MG/DL — SIGNIFICANT CHANGE UP (ref 1.8–2.4)
MCHC RBC-ENTMCNC: 30.5 PG — SIGNIFICANT CHANGE UP (ref 27–31)
MCHC RBC-ENTMCNC: 32.7 G/DL — SIGNIFICANT CHANGE UP (ref 32–37)
MCV RBC AUTO: 93.3 FL — SIGNIFICANT CHANGE UP (ref 80–94)
MONOCYTES # BLD AUTO: 0.59 K/UL — SIGNIFICANT CHANGE UP (ref 0.1–0.6)
MONOCYTES NFR BLD AUTO: 6.5 % — SIGNIFICANT CHANGE UP (ref 1.7–9.3)
NEUTROPHILS # BLD AUTO: 5.58 K/UL — SIGNIFICANT CHANGE UP (ref 1.4–6.5)
NEUTROPHILS NFR BLD AUTO: 61.2 % — SIGNIFICANT CHANGE UP (ref 42.2–75.2)
NRBC # BLD: 0 /100 WBCS — SIGNIFICANT CHANGE UP (ref 0–0)
PLATELET # BLD AUTO: 162 K/UL — SIGNIFICANT CHANGE UP (ref 130–400)
PMV BLD: 10.5 FL — HIGH (ref 7.4–10.4)
POTASSIUM SERPL-MCNC: 3.4 MMOL/L — LOW (ref 3.5–5)
POTASSIUM SERPL-SCNC: 3.4 MMOL/L — LOW (ref 3.5–5)
PROT SERPL-MCNC: 6.6 G/DL — SIGNIFICANT CHANGE UP (ref 6–8)
RBC # BLD: 4.79 M/UL — SIGNIFICANT CHANGE UP (ref 4.7–6.1)
RBC # FLD: 13.2 % — SIGNIFICANT CHANGE UP (ref 11.5–14.5)
SODIUM SERPL-SCNC: 137 MMOL/L — SIGNIFICANT CHANGE UP (ref 135–146)
WBC # BLD: 9.12 K/UL — SIGNIFICANT CHANGE UP (ref 4.8–10.8)
WBC # FLD AUTO: 9.12 K/UL — SIGNIFICANT CHANGE UP (ref 4.8–10.8)

## 2024-08-30 PROCEDURE — 99233 SBSQ HOSP IP/OBS HIGH 50: CPT

## 2024-08-30 PROCEDURE — 93010 ELECTROCARDIOGRAM REPORT: CPT

## 2024-08-30 PROCEDURE — 76775 US EXAM ABDO BACK WALL LIM: CPT | Mod: 26

## 2024-08-30 PROCEDURE — 71045 X-RAY EXAM CHEST 1 VIEW: CPT | Mod: 26

## 2024-08-30 RX ORDER — POTASSIUM CHLORIDE 10 MEQ
40 TABLET, EXT RELEASE, PARTICLES/CRYSTALS ORAL ONCE
Refills: 0 | Status: COMPLETED | OUTPATIENT
Start: 2024-08-30 | End: 2024-08-30

## 2024-08-30 RX ORDER — CLONIDINE HCL 0.2 MG
0.1 TABLET ORAL
Refills: 0 | Status: DISCONTINUED | OUTPATIENT
Start: 2024-08-30 | End: 2024-09-03

## 2024-08-30 RX ORDER — NIFEDIPINE 60 MG/1
60 TABLET, FILM COATED, EXTENDED RELEASE ORAL DAILY
Refills: 0 | Status: DISCONTINUED | OUTPATIENT
Start: 2024-08-31 | End: 2024-09-02

## 2024-08-30 RX ORDER — NICARDIPINE HCL 20 MG
5 CAPSULE ORAL
Qty: 40 | Refills: 0 | Status: DISCONTINUED | OUTPATIENT
Start: 2024-08-30 | End: 2024-08-31

## 2024-08-30 RX ORDER — POTASSIUM CHLORIDE 10 MEQ
20 TABLET, EXT RELEASE, PARTICLES/CRYSTALS ORAL ONCE
Refills: 0 | Status: COMPLETED | OUTPATIENT
Start: 2024-08-30 | End: 2024-08-30

## 2024-08-30 RX ORDER — NIFEDIPINE 60 MG/1
30 TABLET, FILM COATED, EXTENDED RELEASE ORAL ONCE
Refills: 0 | Status: COMPLETED | OUTPATIENT
Start: 2024-08-30 | End: 2024-08-30

## 2024-08-30 RX ORDER — HYDRALAZINE HCL 50 MG
50 TABLET ORAL EVERY 6 HOURS
Refills: 0 | Status: DISCONTINUED | OUTPATIENT
Start: 2024-08-30 | End: 2024-09-03

## 2024-08-30 RX ADMIN — Medication 50 MILLIGRAM(S): at 13:41

## 2024-08-30 RX ADMIN — Medication 200 MILLIGRAM(S): at 05:12

## 2024-08-30 RX ADMIN — Medication 100 GRAM(S): at 08:41

## 2024-08-30 RX ADMIN — Medication 5000 UNIT(S): at 21:35

## 2024-08-30 RX ADMIN — Medication 25 MG/HR: at 05:11

## 2024-08-30 RX ADMIN — NIFEDIPINE 30 MILLIGRAM(S): 60 TABLET, FILM COATED, EXTENDED RELEASE ORAL at 08:04

## 2024-08-30 RX ADMIN — Medication 40 MILLIEQUIVALENT(S): at 08:40

## 2024-08-30 RX ADMIN — Medication 50 MILLIGRAM(S): at 05:11

## 2024-08-30 RX ADMIN — Medication 25 MG/HR: at 01:07

## 2024-08-30 RX ADMIN — Medication 200 MILLIGRAM(S): at 21:35

## 2024-08-30 RX ADMIN — Medication 25 MG/HR: at 08:40

## 2024-08-30 RX ADMIN — Medication 0.1 MILLIGRAM(S): at 17:28

## 2024-08-30 RX ADMIN — Medication 50 MILLIGRAM(S): at 17:28

## 2024-08-30 RX ADMIN — Medication 25 MG/HR: at 12:36

## 2024-08-30 RX ADMIN — Medication 5000 UNIT(S): at 13:41

## 2024-08-30 RX ADMIN — Medication 20 MILLIEQUIVALENT(S): at 08:05

## 2024-08-30 RX ADMIN — NIFEDIPINE 30 MILLIGRAM(S): 60 TABLET, FILM COATED, EXTENDED RELEASE ORAL at 05:13

## 2024-08-30 RX ADMIN — Medication 200 MILLIGRAM(S): at 13:41

## 2024-08-30 RX ADMIN — Medication 5000 UNIT(S): at 05:12

## 2024-08-30 RX ADMIN — Medication 50 MILLIGRAM(S): at 23:09

## 2024-08-30 RX ADMIN — CHLORHEXIDINE GLUCONATE 1 APPLICATION(S): 40 SOLUTION TOPICAL at 21:58

## 2024-08-30 NOTE — PROGRESS NOTE ADULT - ASSESSMENT
35 y/o male with PMH of early onset HTN (since the age of 26), LVH, admitted for elevated BP.  patient had a right humeral fracture and was planned for surgery on 8/29. On pre-surgery eval, his BP was high so he was sent to the ED. Reports that he is on amlodipine 10 mg OD and metoprolol T 50 mg BID but ran out of amlodipine a few days prior to presentation. currently admitted to MICU for hypertensive emergency.   cr 2.4 stable since admission       need to repeat w/up secondary HTN : sono/ doppler renal arteries/ serum metanephrines/ cortisol /  alod level not eleavted  r  tapering nicardipine  BP controlled  < gram proteinuria no hematuria,  outpt Cr would be helpful   ph at goal   f/u KRISTA/ DNA/ C3/ C4/ ANCA/ hepatitis profile / IF/ K/L   cardiology notes appreciated   will follow

## 2024-08-30 NOTE — PROGRESS NOTE ADULT - SUBJECTIVE AND OBJECTIVE BOX
Patient is a 34y old  Male who presents with a chief complaint of     Over Night Events:  Patient seen and examined.   not in distress   still on nicardipine drip     ROS:  See HPI    PHYSICAL EXAM    ICU Vital Signs Last 24 Hrs  T(C): 36.5 (29 Aug 2024 20:00), Max: 36.5 (29 Aug 2024 20:00)  T(F): 97.7 (29 Aug 2024 20:00), Max: 97.7 (29 Aug 2024 20:00)  HR: 76 (30 Aug 2024 07:00) (75 - 103)  BP: --  BP(mean): --  ABP: 131/64 (30 Aug 2024 07:00) (125/56 - 191/106)  ABP(mean): 84 (30 Aug 2024 07:00) (76 - 137)  RR: 16 (30 Aug 2024 07:00) (13 - 40)  SpO2: 96% (30 Aug 2024 07:00) (95% - 100%)    O2 Parameters below as of 30 Aug 2024 07:00  Patient On (Oxygen Delivery Method): room air            General: awake   HEENT:     sharan           Lymph Nodes: NO cervical LN   Lungs: Bilateral BS  Cardiovascular: Regular   Abdomen: Soft, Positive BS  Extremities: No clubbing   Skin: warm   Neurological: no focal   Musculoskeletal: move all ext     I&O's Detail    29 Aug 2024 07:01  -  30 Aug 2024 07:00  --------------------------------------------------------  IN:    NiCARdipine: 1302.5 mL    Oral Fluid: 1710 mL  Total IN: 3012.5 mL    OUT:    Voided (mL): 2825 mL  Total OUT: 2825 mL    Total NET: 187.5 mL          LABS:                          14.6   9.12  )-----------( 162      ( 30 Aug 2024 05:22 )             44.7         30 Aug 2024 05:22    137    |  102    |  25     ----------------------------<  105    3.4     |  22     |  2.3      Ca    9.2        30 Aug 2024 05:22  Mg     1.8       30 Aug 2024 05:22    TPro  6.6    /  Alb  4.2    /  TBili  0.7    /  DBili  x      /  AST  12     /  ALT  16     /  AlkPhos  126    30 Aug 2024 05:22  Amylase x     lipase x                                                                                        Urinalysis Basic - ( 30 Aug 2024 05:22 )    Color: x / Appearance: x / SG: x / pH: x  Gluc: 105 mg/dL / Ketone: x  / Bili: x / Urobili: x   Blood: x / Protein: x / Nitrite: x   Leuk Esterase: x / RBC: x / WBC x   Sq Epi: x / Non Sq Epi: x / Bacteria: x                                                                                                                                                     MEDICATIONS  (STANDING):  chlorhexidine 2% Cloths 1 Application(s) Topical daily  heparin   Injectable 5000 Unit(s) SubCutaneous every 8 hours  hydrALAZINE 50 milliGRAM(s) Oral every 8 hours  hydrochlorothiazide 25 milliGRAM(s) Oral daily  labetalol 200 milliGRAM(s) Oral three times a day  niCARdipine Infusion 5 mG/Hr (25 mL/Hr) IV Continuous <Continuous>  NIFEdipine XL 30 milliGRAM(s) Oral daily    MEDICATIONS  (PRN):  acetaminophen     Tablet .. 650 milliGRAM(s) Oral every 6 hours PRN Moderate Pain (4 - 6)          Xrays:  TLC:  OG:  ET tube:                                                                                    clear    ECHO:  [facility]

## 2024-08-30 NOTE — PROGRESS NOTE ADULT - ASSESSMENT
34 y.o M admitted for hypertensive emergency       IMPRESSION:  # hypertensive emergency (MARCO + elevated trop) in the setting of medication non compliance  # Non oliguric MARCO with proteinuria likely on hypertensive CKD   # Hypertensive heart disease (LVH)  #MARCO      PLAN:    CNS:   - Avoid CNS Depressants     HEENT:   - Oral care.   - Aspiration precautions     PULMONARY:   - HOB @ 45.   - Monitor Pulse Ox. Keep > 93%. Supplement as needed.    CARDIOVASCULAR:   - Daily Weight.   - Strict I&Os. Keep I < O  - nicardipine drip   - c/w labetalol 100mg tid and hydralazine 25mg tid  - will not give ACE-i or ARBs given the MARCO  - TTE   - repeat ECG in AM   - regarding the workup of secondary HTN; patient doesn't have the previous records -> will repeat it  f up aldosterone (patient is not on ACE-i or ARBs; less likely high jorden as K+ is within normal limits), renin level, metanephrine urine and serum, TSH  Renal US with duplex renal arteries  - taper nicardipine drips  -increased Procardia 60mg  - goal -160  -echo showed infiltrative cardiomyopathy- pending cardiac MRI  -Marya Patton from cardiac imaging called today 8/30 stating theres a long list of people waiting to seen and if pt doesnt have plans for an ICD he can get the MRI outpt  -spoke with Dr. Allen office in regards to pt. He has been prescribed Amlodipine, 10 mg and Metoprolol 50 mg but pt last picked up script June 2023.     GI:   - GI prophylaxis.   - Diet: DASH/TLC    RENAL:   - Creatinine today: 2.5  - K+ and bicarb within normal limits   - no joints pain/rigidity ; no skin rash; no family history of CKD/dialysis  - Nephrology consult  - Avoid nephrotoxic agents  - Monitor BUN/creatinine  - workup as above   - no indication for HD now   -nephro adjusted hydralazine, and cardio started Procardia  -renal US- showed one cyst    INFECTIOUS DISEASE:   - Monitor WBC  - Trend fever  - no signs or symptoms of infection    HEMATOLOGICAL:   - Monitor H&H  - DVT prophylaxis: heparin SQ    ENDOCRINE:   - Monitor FS  - f up a1c    MUSCULOSKELETAL:   - Ambulate as tolerated   - humeral fracture, needs surgery once optimized cardiac wise      #Med rec: done  #Code status: full

## 2024-08-30 NOTE — PROGRESS NOTE ADULT - ASSESSMENT
34 y.o M admitted for hypertensive emergency       IMPRESSION:  # hypertensive emergency (MARCO + elevated trop) in the setting of medication non compliance  # Non oliguric MARCO with proteinuria likely on hypertensive CKD   # Hypertensive heart disease (LVH)  MARCO  PLAN:    CNS:   - Avoid CNS Depressants     HEENT:   - Oral care.   - Aspiration precautions     PULMONARY:   - HOB @ 45.   - Monitor Pulse Ox. Keep > 93%. Supplement as needed.    CARDIOVASCULAR:   - Daily Weight.   - Strict I&Os. Keep I < O  - nicardipine drip taper down  target     increase procardia  to 60   - labetalol and hydralazine standing   cardiac MRI f  follow cardiology     GI:   - GI prophylaxis.   - Diet: DASH/TLC    RENAL:   - Creatinine today: 2.5  - K+ and bicarb within normal limits   - Nephrology follow up   - Avoid nephrotoxic agents  - Monitor BUN/creatinine  - workup as above   - no indication for HD now     INFECTIOUS DISEASE:   - Monitor WBC  - Trend fever  - no signs or symptoms of infection    HEMATOLOGICAL:   - Monitor H&H  - DVT prophylaxis: heparin SQ    ENDOCRINE:   - Monitor FS  - f up a1c    MUSCULOSKELETAL:   - Ambulate as tolerated       #Med rec: done  #Code status: full    34 y.o M admitted for hypertensive emergency       IMPRESSION:  # hypertensive emergency (MARCO + elevated trop) in the setting of medication non compliance  # Non oliguric MARCO with proteinuria likely on hypertensive CKD   # Hypertensive heart disease (LVH)  MARCO  PLAN:    CNS:   - Avoid CNS Depressants     HEENT:   - Oral care.   - Aspiration precautions     PULMONARY:   - HOB @ 45.   - Monitor Pulse Ox. Keep > 93%. Supplement as needed.    CARDIOVASCULAR:   - Daily Weight.   - Strict I&Os. Keep I < O  - nicardipine drip taper down  target     increase procardia  to 60   - labetalol and hydralazine standing   cardiac MRI f  follow cardiology     GI:   - GI prophylaxis.   - Diet: DASH/TLC    RENAL:   - Creatinine today: 2.5  - K+ and bicarb within normal limits   - Nephrology follow up   - Avoid nephrotoxic agents  - Monitor BUN/creatinine  - workup as above   - no indication for HD now     INFECTIOUS DISEASE:   - Monitor WBC  - Trend fever  - no signs or symptoms of infection    HEMATOLOGICAL:   - Monitor H&H  - DVT prophylaxis: heparin SQ    ENDOCRINE:   - Monitor FS  - f up a1c    MUSCULOSKELETAL:  humeral fx pending surgery when stable has cast   - Ambulate as tolerated       #Med rec: done  #Code status: full

## 2024-08-30 NOTE — PROGRESS NOTE ADULT - SUBJECTIVE AND OBJECTIVE BOX
Nephrology progress note    Patient was seen and examined, events over the last 24 h noted .    Allergies:  No Known Allergies    Hospital Medications:   MEDICATIONS  (STANDING):  chlorhexidine 2% Cloths 1 Application(s) Topical daily  heparin   Injectable 5000 Unit(s) SubCutaneous every 8 hours  hydrALAZINE 50 milliGRAM(s) Oral every 8 hours  hydrochlorothiazide 25 milliGRAM(s) Oral daily  labetalol 200 milliGRAM(s) Oral three times a day  niCARdipine Infusion 5 mG/Hr (25 mL/Hr) IV Continuous <Continuous>        VITALS:  T(F): 97.8 (08-30-24 @ 12:00), Max: 97.8 (08-30-24 @ 12:00)  HR: 86 (08-30-24 @ 13:00)  BP: 125/60 (08-30-24 @ 12:10)  RR: 25 (08-30-24 @ 13:00)  SpO2: 99% (08-30-24 @ 13:00)  Wt(kg): --    08-28 @ 07:01  -  08-29 @ 07:00  --------------------------------------------------------  IN: 2470 mL / OUT: 1600 mL / NET: 870 mL    08-29 @ 07:01  -  08-30 @ 07:00  --------------------------------------------------------  IN: 3012.5 mL / OUT: 2825 mL / NET: 187.5 mL    08-30 @ 07:01  -  08-30 @ 13:52  --------------------------------------------------------  IN: 165 mL / OUT: 725 mL / NET: -560 mL          PHYSICAL EXAM:  Constitutional: NAD  HEENT: anicteric sclera, oropharynx clear, MMM  Neck: No JVD  Respiratory: CTAB, no wheezes, rales or rhonchi  Cardiovascular: S1, S2, RRR  Gastrointestinal: BS+, soft, NT/ND  Extremities: No cyanosis or clubbing. No peripheral edema  :  No espinosa.   Skin: No rashes    LABS:  08-30    137  |  102  |  25<H>  ----------------------------<  105<H>  3.4<L>   |  22  |  2.3<H>    Ca    9.2      30 Aug 2024 05:22  Mg     1.8     08-30    TPro  6.6  /  Alb  4.2  /  TBili  0.7  /  DBili      /  AST  12  /  ALT  16  /  AlkPhos  126<H>  08-30                          14.6   9.12  )-----------( 162      ( 30 Aug 2024 05:22 )             44.7       Urine Studies:  Urinalysis Basic - ( 30 Aug 2024 05:22 )    Color:  / Appearance:  / SG:  / pH:   Gluc: 105 mg/dL / Ketone:   / Bili:  / Urobili:    Blood:  / Protein:  / Nitrite:    Leuk Esterase:  / RBC:  / WBC    Sq Epi:  / Non Sq Epi:  / Bacteria:       Creatinine, Random Urine: 65 mg/dL (08-29 @ 09:37)  Protein/Creatinine Ratio Calculation: 0.4 Ratio (08-29 @ 09:37)  Sodium, Random Urine: 69.0 mmoL/L (08-27 @ 20:13)  Creatinine, Random Urine: 46 mg/dL (08-27 @ 20:13)  Protein/Creatinine Ratio Calculation: 1.0 Ratio (08-27 @ 20:13)  Osmolality, Random Urine: 268 mos/kg (08-27 @ 20:13)  Potassium, Random Urine: 9 mmol/L (08-27 @ 20:13)    RADIOLOGY & ADDITIONAL STUDIES:

## 2024-08-31 LAB
ALBUMIN SERPL ELPH-MCNC: 3.9 G/DL — SIGNIFICANT CHANGE UP (ref 3.5–5.2)
ALP SERPL-CCNC: 109 U/L — SIGNIFICANT CHANGE UP (ref 30–115)
ALT FLD-CCNC: 21 U/L — SIGNIFICANT CHANGE UP (ref 0–41)
ANION GAP SERPL CALC-SCNC: 13 MMOL/L — SIGNIFICANT CHANGE UP (ref 7–14)
AST SERPL-CCNC: 20 U/L — SIGNIFICANT CHANGE UP (ref 0–41)
BASOPHILS # BLD AUTO: 0.04 K/UL — SIGNIFICANT CHANGE UP (ref 0–0.2)
BASOPHILS NFR BLD AUTO: 0.5 % — SIGNIFICANT CHANGE UP (ref 0–1)
BILIRUB SERPL-MCNC: 0.5 MG/DL — SIGNIFICANT CHANGE UP (ref 0.2–1.2)
BUN SERPL-MCNC: 29 MG/DL — HIGH (ref 10–20)
CALCIUM SERPL-MCNC: 8.8 MG/DL — SIGNIFICANT CHANGE UP (ref 8.4–10.4)
CHLORIDE SERPL-SCNC: 101 MMOL/L — SIGNIFICANT CHANGE UP (ref 98–110)
CO2 SERPL-SCNC: 24 MMOL/L — SIGNIFICANT CHANGE UP (ref 17–32)
CREAT SERPL-MCNC: 2.5 MG/DL — HIGH (ref 0.7–1.5)
DSDNA AB SER-ACNC: <1 IU/ML — SIGNIFICANT CHANGE UP
EGFR: 34 ML/MIN/1.73M2 — LOW
EOSINOPHIL # BLD AUTO: 0.21 K/UL — SIGNIFICANT CHANGE UP (ref 0–0.7)
EOSINOPHIL NFR BLD AUTO: 2.8 % — SIGNIFICANT CHANGE UP (ref 0–8)
GLUCOSE SERPL-MCNC: 99 MG/DL — SIGNIFICANT CHANGE UP (ref 70–99)
HAV IGG SER QL IA: SIGNIFICANT CHANGE UP
HAV IGM SER-ACNC: SIGNIFICANT CHANGE UP
HBV CORE AB SER-ACNC: SIGNIFICANT CHANGE UP
HBV CORE IGM SER-ACNC: SIGNIFICANT CHANGE UP
HBV SURFACE AB SER-ACNC: SIGNIFICANT CHANGE UP
HBV SURFACE AG SER-ACNC: SIGNIFICANT CHANGE UP
HCT VFR BLD CALC: 40.2 % — LOW (ref 42–52)
HCV AB S/CO SERPL IA: 0.09 S/CO — SIGNIFICANT CHANGE UP (ref 0–0.99)
HCV AB SERPL-IMP: SIGNIFICANT CHANGE UP
HGB BLD-MCNC: 13 G/DL — LOW (ref 14–18)
IMM GRANULOCYTES NFR BLD AUTO: 0.3 % — SIGNIFICANT CHANGE UP (ref 0.1–0.3)
LYMPHOCYTES # BLD AUTO: 2.18 K/UL — SIGNIFICANT CHANGE UP (ref 1.2–3.4)
LYMPHOCYTES # BLD AUTO: 29.3 % — SIGNIFICANT CHANGE UP (ref 20.5–51.1)
MAGNESIUM SERPL-MCNC: 2.2 MG/DL — SIGNIFICANT CHANGE UP (ref 1.8–2.4)
MCHC RBC-ENTMCNC: 30.3 PG — SIGNIFICANT CHANGE UP (ref 27–31)
MCHC RBC-ENTMCNC: 32.3 G/DL — SIGNIFICANT CHANGE UP (ref 32–37)
MCV RBC AUTO: 93.7 FL — SIGNIFICANT CHANGE UP (ref 80–94)
MONOCYTES # BLD AUTO: 0.54 K/UL — SIGNIFICANT CHANGE UP (ref 0.1–0.6)
MONOCYTES NFR BLD AUTO: 7.3 % — SIGNIFICANT CHANGE UP (ref 1.7–9.3)
NEUTROPHILS # BLD AUTO: 4.45 K/UL — SIGNIFICANT CHANGE UP (ref 1.4–6.5)
NEUTROPHILS NFR BLD AUTO: 59.8 % — SIGNIFICANT CHANGE UP (ref 42.2–75.2)
NRBC # BLD: 0 /100 WBCS — SIGNIFICANT CHANGE UP (ref 0–0)
PLATELET # BLD AUTO: 141 K/UL — SIGNIFICANT CHANGE UP (ref 130–400)
PMV BLD: 10.4 FL — SIGNIFICANT CHANGE UP (ref 7.4–10.4)
POTASSIUM SERPL-MCNC: 3.6 MMOL/L — SIGNIFICANT CHANGE UP (ref 3.5–5)
POTASSIUM SERPL-SCNC: 3.6 MMOL/L — SIGNIFICANT CHANGE UP (ref 3.5–5)
PROT SERPL-MCNC: 6 G/DL — SIGNIFICANT CHANGE UP (ref 6–8)
RBC # BLD: 4.29 M/UL — LOW (ref 4.7–6.1)
RBC # FLD: 13.3 % — SIGNIFICANT CHANGE UP (ref 11.5–14.5)
SODIUM SERPL-SCNC: 138 MMOL/L — SIGNIFICANT CHANGE UP (ref 135–146)
WBC # BLD: 7.44 K/UL — SIGNIFICANT CHANGE UP (ref 4.8–10.8)
WBC # FLD AUTO: 7.44 K/UL — SIGNIFICANT CHANGE UP (ref 4.8–10.8)

## 2024-08-31 PROCEDURE — 71045 X-RAY EXAM CHEST 1 VIEW: CPT | Mod: 26

## 2024-08-31 PROCEDURE — 99232 SBSQ HOSP IP/OBS MODERATE 35: CPT

## 2024-08-31 PROCEDURE — 99233 SBSQ HOSP IP/OBS HIGH 50: CPT

## 2024-08-31 PROCEDURE — 93010 ELECTROCARDIOGRAM REPORT: CPT

## 2024-08-31 RX ADMIN — ACETAMINOPHEN 650 MILLIGRAM(S): 325 TABLET ORAL at 11:23

## 2024-08-31 RX ADMIN — Medication 50 MILLIGRAM(S): at 11:23

## 2024-08-31 RX ADMIN — Medication 50 MILLIGRAM(S): at 05:16

## 2024-08-31 RX ADMIN — Medication 5000 UNIT(S): at 21:05

## 2024-08-31 RX ADMIN — Medication 200 MILLIGRAM(S): at 15:01

## 2024-08-31 RX ADMIN — CHLORHEXIDINE GLUCONATE 1 APPLICATION(S): 40 SOLUTION TOPICAL at 21:00

## 2024-08-31 RX ADMIN — Medication 5000 UNIT(S): at 15:01

## 2024-08-31 RX ADMIN — Medication 5000 UNIT(S): at 05:16

## 2024-08-31 RX ADMIN — Medication 200 MILLIGRAM(S): at 21:05

## 2024-08-31 RX ADMIN — ACETAMINOPHEN 650 MILLIGRAM(S): 325 TABLET ORAL at 11:40

## 2024-08-31 RX ADMIN — Medication 0.1 MILLIGRAM(S): at 05:15

## 2024-08-31 RX ADMIN — Medication 0.1 MILLIGRAM(S): at 18:10

## 2024-08-31 RX ADMIN — Medication 50 MILLIGRAM(S): at 18:10

## 2024-08-31 RX ADMIN — Medication 200 MILLIGRAM(S): at 05:15

## 2024-08-31 RX ADMIN — NIFEDIPINE 60 MILLIGRAM(S): 60 TABLET, FILM COATED, EXTENDED RELEASE ORAL at 05:15

## 2024-08-31 NOTE — PROGRESS NOTE ADULT - ASSESSMENT
33 y/o male with PMH of early onset HTN (since the age of 26), LVH, admitted for elevated BP.  patient had a right humeral fracture and was planned for surgery on 8/29. On pre-surgery eval, his BP was high so he was sent to the ED. Reports that he is on amlodipine 10 mg OD and metoprolol T 50 mg BID but ran out of amlodipine a few days prior to presentation. currently admitted to MICU for hypertensive emergency.   cr 2.4 stable since admission     need to repeat w/up secondary HTN : sono/ doppler renal arteries/ serum metanephrines/ cortisol /  alod level not eleavted  r  tapering nicardipine  BP controlled  < gram proteinuria no hematuria,  outpt Cr would be helpful   ph at goal   f/u KRISTA/ DNA/ C3/ C4/ ANCA/ hepatitis profile / IF/ K/L   cardiology notes appreciated   will follow

## 2024-08-31 NOTE — PROGRESS NOTE ADULT - SUBJECTIVE AND OBJECTIVE BOX
Patient is a 34y old  Male who presents with a chief complaint of       Over Night Events:    None         ROS:  See HPI    PHYSICAL EXAM    ICU Vital Signs Last 24 Hrs  T(C): 35.8 (31 Aug 2024 04:00), Max: 36.7 (30 Aug 2024 20:00)  T(F): 96.5 (31 Aug 2024 04:00), Max: 98 (30 Aug 2024 20:00)  HR: 76 (31 Aug 2024 08:00) (72 - 88)  BP: 125/60 (30 Aug 2024 12:10) (125/60 - 125/60)  BP(mean): 85 (30 Aug 2024 12:10) (85 - 85)  ABP: 144/73 (31 Aug 2024 08:00) (137/58 - 167/69)  ABP(mean): 92 (31 Aug 2024 08:00) (78 - 98)  RR: 18 (31 Aug 2024 08:00) (14 - 29)  SpO2: 99% (31 Aug 2024 08:00) (97% - 100%)    O2 Parameters below as of 31 Aug 2024 08:00  Patient On (Oxygen Delivery Method): room air            General: NAD   HEENT: ADRIÁN             Lymphatic system: No cervical LN   Lungs: Bilateral BS, clear   Cardiovascular: Regular   Gastrointestinal: Soft, Positive BS  Extremities: No clubbing.  Moves extremities.  Full Range of motion   Skin: Warm, intact  Neurological: No motor or sensory deficit       08-30-24 @ 07:01  -  08-31-24 @ 07:00  --------------------------------------------------------  IN:    NiCARdipine: 472.5 mL    NiCARdipine: 75 mL    Oral Fluid: 560 mL  Total IN: 1107.5 mL    OUT:    Voided (mL): 2235 mL  Total OUT: 2235 mL    Total NET: -1127.5 mL          LABS:                            13.0   7.44  )-----------( 141      ( 31 Aug 2024 04:57 )             40.2                                               08-31    138  |  101  |  29<H>  ----------------------------<  99  3.6   |  24  |  2.5<H>    Ca    8.8      31 Aug 2024 04:57  Mg     2.2     08-31    TPro  6.0  /  Alb  3.9  /  TBili  0.5  /  DBili  x   /  AST  20  /  ALT  21  /  AlkPhos  109  08-31                                             Urinalysis Basic - ( 31 Aug 2024 04:57 )    Color: x / Appearance: x / SG: x / pH: x  Gluc: 99 mg/dL / Ketone: x  / Bili: x / Urobili: x   Blood: x / Protein: x / Nitrite: x   Leuk Esterase: x / RBC: x / WBC x   Sq Epi: x / Non Sq Epi: x / Bacteria: x                                                  LIVER FUNCTIONS - ( 31 Aug 2024 04:57 )  Alb: 3.9 g/dL / Pro: 6.0 g/dL / ALK PHOS: 109 U/L / ALT: 21 U/L / AST: 20 U/L / GGT: x                                                                                                                                       MEDICATIONS  (STANDING):  chlorhexidine 2% Cloths 1 Application(s) Topical daily  cloNIDine 0.1 milliGRAM(s) Oral two times a day  heparin   Injectable 5000 Unit(s) SubCutaneous every 8 hours  hydrALAZINE 50 milliGRAM(s) Oral every 6 hours  hydrochlorothiazide 25 milliGRAM(s) Oral daily  labetalol 200 milliGRAM(s) Oral three times a day  niCARdipine Infusion 5 mG/Hr (25 mL/Hr) IV Continuous <Continuous>  NIFEdipine XL 60 milliGRAM(s) Oral daily    MEDICATIONS  (PRN):  acetaminophen     Tablet .. 650 milliGRAM(s) Oral every 6 hours PRN Moderate Pain (4 - 6)      Xrays:                                                                                     ECHO

## 2024-08-31 NOTE — PROGRESS NOTE ADULT - ASSESSMENT
34 y.o M admitted for hypertensive emergency       IMPRESSION:  # hypertensive emergency (MARCO + elevated trop) in the setting of medication non compliance  # Non oliguric MARCO with proteinuria likely on hypertensive CKD   # Hypertensive heart disease (LVH)  #MARCO      PLAN:    CNS:   - Avoid CNS Depressants     HEENT:   - Oral care.   - Aspiration precautions     PULMONARY:   - HOB @ 45.   - Monitor Pulse Ox. Keep > 93%. Supplement as needed.    CARDIOVASCULAR:   - Daily Weight.   - Strict I&Os. Keep I < O  - nicardipine drip   - c/w labetalol 100mg tid and hydralazine 25mg tid  - will not give ACE-i or ARBs given the MARCO  - TTE   - repeat ECG in AM   - regarding the workup of secondary HTN; patient doesn't have the previous records -> will repeat it  f up aldosterone (patient is not on ACE-i or ARBs; less likely high jorden as K+ is within normal limits), renin level, metanephrine urine and serum, TSH  Renal US with duplex renal arteries  -Started on clonidine 08/30  - taper nicardipine drips  -increased Procardia 60mg  - goal -160  -echo showed infiltrative cardiomyopathy- pending cardiac MRI  -Marya Patton from cardiac imaging called today 8/30 stating theres a long list of people waiting to seen and if pt doesnt have plans for an ICD he can get the MRI outpt  -spoke with Dr. Allen office in regards to pt. He has been prescribed Amlodipine, 10 mg and Metoprolol 50 mg but pt last picked up script June 2023.     GI:   - GI prophylaxis.   - Diet: DASH/TLC    RENAL:   - Creatinine today: 2.5  - K+ and bicarb within normal limits   - no joints pain/rigidity ; no skin rash; no family history of CKD/dialysis  - Nephrology consult  - Avoid nephrotoxic agents  - Monitor BUN/creatinine  - workup as above   - no indication for HD now   -nephro adjusted hydralazine, and cardio started Procardia  -renal US- showed one cyst    INFECTIOUS DISEASE:   - Monitor WBC  - Trend fever  - no signs or symptoms of infection    HEMATOLOGICAL:   - Monitor H&H  - DVT prophylaxis: heparin SQ    ENDOCRINE:   - Monitor FS  - f up a1c    MUSCULOSKELETAL:   - Ambulate as tolerated   - humeral fracture, needs surgery once optimized cardiac wise      #Med rec: done  #Code status: full

## 2024-08-31 NOTE — PROGRESS NOTE ADULT - SUBJECTIVE AND OBJECTIVE BOX
Nephrology progress note    Patient is seen and examined, events over the last 24 h noted.  Renal function stable, nonoliguric.    Allergies:  No Known Allergies    Hospital Medications:   MEDICATIONS  (STANDING):  chlorhexidine 2% Cloths 1 Application(s) Topical daily  cloNIDine 0.1 milliGRAM(s) Oral two times a day  heparin   Injectable 5000 Unit(s) SubCutaneous every 8 hours  hydrALAZINE 50 milliGRAM(s) Oral every 6 hours  hydrochlorothiazide 25 milliGRAM(s) Oral daily  labetalol 200 milliGRAM(s) Oral three times a day  NIFEdipine XL 60 milliGRAM(s) Oral daily        VITALS:  T(F): 96.5 (08-31-24 @ 04:00), Max: 98 (08-30-24 @ 20:00)  HR: 76 (08-31-24 @ 08:00)  BP: 125/60 (08-30-24 @ 12:10)  RR: 18 (08-31-24 @ 08:00)  SpO2: 99% (08-31-24 @ 08:00)  Wt(kg): --    08-29 @ 07:01  -  08-30 @ 07:00  --------------------------------------------------------  IN: 3012.5 mL / OUT: 2825 mL / NET: 187.5 mL    08-30 @ 07:01  -  08-31 @ 07:00  --------------------------------------------------------  IN: 1107.5 mL / OUT: 2235 mL / NET: -1127.5 mL          PHYSICAL EXAM:  Constitutional: NAD  HEENT: anicteric sclera, oropharynx clear, MMM  Neck: No JVD  Respiratory: CTAB, no wheezes, rales or rhonchi  Cardiovascular: S1, S2, RRR  Gastrointestinal: BS+, soft, NT/ND  Extremities: No cyanosis or clubbing. No peripheral edema  :  No espinosa.   Skin: No rashes    LABS:  08-31    138  |  101  |  29<H>  ----------------------------<  99  3.6   |  24  |  2.5<H>    Ca    8.8      31 Aug 2024 04:57  Mg     2.2     08-31    TPro  6.0  /  Alb  3.9  /  TBili  0.5  /  DBili      /  AST  20  /  ALT  21  /  AlkPhos  109  08-31                          13.0   7.44  )-----------( 141      ( 31 Aug 2024 04:57 )             40.2       Urine Studies:  Urinalysis Basic - ( 31 Aug 2024 04:57 )    Color:  / Appearance:  / SG:  / pH:   Gluc: 99 mg/dL / Ketone:   / Bili:  / Urobili:    Blood:  / Protein:  / Nitrite:    Leuk Esterase:  / RBC:  / WBC    Sq Epi:  / Non Sq Epi:  / Bacteria:       Creatinine, Random Urine: 65 mg/dL (08-29 @ 09:37)  Protein/Creatinine Ratio Calculation: 0.4 Ratio (08-29 @ 09:37)  Sodium, Random Urine: 69.0 mmoL/L (08-27 @ 20:13)  Creatinine, Random Urine: 46 mg/dL (08-27 @ 20:13)  Protein/Creatinine Ratio Calculation: 1.0 Ratio (08-27 @ 20:13)  Osmolality, Random Urine: 268 mos/kg (08-27 @ 20:13)  Potassium, Random Urine: 9 mmol/L (08-27 @ 20:13)    RADIOLOGY & ADDITIONAL STUDIES:

## 2024-08-31 NOTE — PROGRESS NOTE ADULT - ASSESSMENT
IMPRESSION:  # hypertensive emergency (MARCO + elevated trop) in the setting of medication non compliance - resolved   # Likely CKD   # Hypertensive heart disease (LVH)    PLAN:    CNS:   - Avoid CNS Depressants     HEENT:   - Oral care.   - Aspiration precautions     PULMONARY:   - On room air    CARDIOVASCULAR:   - Multiple BP meds.   - Echo noted; cardiac MRI  - Cardiology follow up.   - Off cardene drip     GI:   - GI prophylaxis.   - Diet: DASH/TLC    RENAL:   - Creatinine today: 2.5  - K+ and bicarb within normal limits   - Nephrology follow up   - No espinosa      INFECTIOUS DISEASE:   - Monitor WBC  - Trend fever  - no signs or symptoms of infection    HEMATOLOGICAL:   - Monitor H&H  - DVT prophylaxis: heparin SQ    ENDOCRINE:   - Monitor FS  - f up a1c    MUSCULOSKELETAL:  humeral fx pending surgery when stable has cast   - Ambulate as tolerated     Medical floor.       #Med rec: done  #Code status: full

## 2024-08-31 NOTE — PROGRESS NOTE ADULT - SUBJECTIVE AND OBJECTIVE BOX
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   patient had a right humeral fracture and was planned for surgery this Thursday.   On pre-surgery eval, his BP was high so he was sent to the ED.   Yesterday, he came to the ED but then left AMA cause he didn't have his phone with him  He came back today.  He denied any pain, and he walks every day around 1 to 2 miles with no symptoms.   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  Reports that he is on amlodipine 10 mg OD and metoprolol T 50 mg BID. But he didn't take them for the past 3 days.     In 2018 he was started on Hyzaar, reports he had elevated BP despite the medication, was started on amlodipine - developed edema, then in 2020 was switched by Dr. Chen to Coreg and Furosemide. Then last time seen by Dr Carson in 2020 for HTN and the plan was to continue same management and add valsartan if BP was still high; but patient was lost to follow up.   Back then Renal Doppler was negative, and reportedly workup forn secondary HTN with Dr. Chen was negative (we don't have them). ECG in 2020 revealed LVH with secondary changes and PVC's.    Patient reports that he was following with his PCP dr Sharp on [facility]. And she told him previously that his creatinine was high but he didn't follow up with a nephrologist.   he rarely checks his BP at home; and when he does it is usually 180-190.   patient is not in pain from the humeral fracture    The patient denies chest pain, dyspnea, orthopnea or PND.   No syncope or falls. No edema.     No previous TTE on the system    Family Hx:   Father : HTN starting age 55 y.   Siblings: sister and bother younger than him): no BP      In the ED   vitals   · BP  224/ 137 mm Hg  · Heart Rate	96 /min  · Respiration Rate 16 /min  · Temp 36.9 C  · SpO2 	98 %      trop 28   CBC unremarkable   creat 2.6     given in the ed labetalol 10 and started on nicardipine drip  (27 Aug 2024 17:18)       INTERVAL HPI/OVERNIGHT EVENTS:   No overnight events   Afebrile, hemodynamically stable     Subjective:    ICU Vital Signs Last 24 Hrs  T(C): 36.7 (30 Aug 2024 20:00), Max: 36.7 (30 Aug 2024 20:00)  T(F): 98 (30 Aug 2024 20:00), Max: 98 (30 Aug 2024 20:00)  HR: 76 (30 Aug 2024 23:00) (74 - 88)  BP: 125/60 (30 Aug 2024 12:10) (125/60 - 130/62)  BP(mean): 85 (30 Aug 2024 12:10) (85 - 89)  ABP: 149/65 (30 Aug 2024 23:00) (128/61 - 167/69)  ABP(mean): 84 (30 Aug 2024 23:00) (78 - 93)  RR: 19 (30 Aug 2024 23:00) (12 - 27)  SpO2: 97% (30 Aug 2024 23:00) (95% - 100%)    O2 Parameters below as of 30 Aug 2024 23:00  Patient On (Oxygen Delivery Method): room air          I&O's Summary    29 Aug 2024 07:01  -  30 Aug 2024 07:00  --------------------------------------------------------  IN: 3012.5 mL / OUT: 2825 mL / NET: 187.5 mL    30 Aug 2024 07:01  -  31 Aug 2024 00:11  --------------------------------------------------------  IN: 607.5 mL / OUT: 1335 mL / NET: -727.5 mL          Daily     Daily     Adult Advanced Hemodynamics Last 24 Hrs  CVP(mm Hg): --  CVP(cm H2O): --  CO: --  CI: --  PA: --  PA(mean): --  PCWP: --  SVR: --  SVRI: --  PVR: --  PVRI: --    EKG/Telemetry Events:    MEDICATIONS  (STANDING):  chlorhexidine 2% Cloths 1 Application(s) Topical daily  cloNIDine 0.1 milliGRAM(s) Oral two times a day  heparin   Injectable 5000 Unit(s) SubCutaneous every 8 hours  hydrALAZINE 50 milliGRAM(s) Oral every 6 hours  hydrochlorothiazide 25 milliGRAM(s) Oral daily  labetalol 200 milliGRAM(s) Oral three times a day  niCARdipine Infusion 5 mG/Hr (25 mL/Hr) IV Continuous <Continuous>  NIFEdipine XL 60 milliGRAM(s) Oral daily    MEDICATIONS  (PRN):  acetaminophen     Tablet .. 650 milliGRAM(s) Oral every 6 hours PRN Moderate Pain (4 - 6)      PHYSICAL EXAM:  GENERAL:   HEAD:  Atraumatic, Normocephalic  EYES: EOMI, PERRLA, conjunctiva and sclera clear  NECK: Supple, No JVD, Normal thyroid, no enlarged nodes  NERVOUS SYSTEM:  Alert & Awake.   CHEST/LUNG: B/L good air entry; No rales, rhonchi, or wheezing  HEART: S1S2 normal, no S3, Regular rate and rhythm; No murmurs  ABDOMEN: Soft, Nontender, Nondistended; Bowel sounds present  EXTREMITIES:  2+ Peripheral Pulses, No clubbing, cyanosis, or edema  LYMPH: No lymphadenopathy noted  SKIN: No rashes or lesions    LABS:                        14.6   9.12  )-----------( 162      ( 30 Aug 2024 05:22 )             44.7     08-30    137  |  102  |  25<H>  ----------------------------<  105<H>  3.4<L>   |  22  |  2.3<H>    Ca    9.2      30 Aug 2024 05:22  Mg     1.8     08-30    TPro  6.6  /  Alb  4.2  /  TBili  0.7  /  DBili  x   /  AST  12  /  ALT  16  /  AlkPhos  126<H>  08-30    LIVER FUNCTIONS - ( 30 Aug 2024 05:22 )  Alb: 4.2 g/dL / Pro: 6.6 g/dL / ALK PHOS: 126 U/L / ALT: 16 U/L / AST: 12 U/L / GGT: x             CAPILLARY BLOOD GLUCOSE                Urinalysis Basic - ( 30 Aug 2024 05:22 )    Color: x / Appearance: x / SG: x / pH: x  Gluc: 105 mg/dL / Ketone: x  / Bili: x / Urobili: x   Blood: x / Protein: x / Nitrite: x   Leuk Esterase: x / RBC: x / WBC x   Sq Epi: x / Non Sq Epi: x / Bacteria: x          RADIOLOGY & ADDITIONAL TESTS:  CXR:        Care Discussed with Consultants/Other Providers [ x] YES  [ ] NO

## 2024-09-01 LAB
ALBUMIN SERPL ELPH-MCNC: 3.9 G/DL — SIGNIFICANT CHANGE UP (ref 3.5–5.2)
ALP SERPL-CCNC: 111 U/L — SIGNIFICANT CHANGE UP (ref 30–115)
ALT FLD-CCNC: 67 U/L — HIGH (ref 0–41)
ANION GAP SERPL CALC-SCNC: 14 MMOL/L — SIGNIFICANT CHANGE UP (ref 7–14)
AST SERPL-CCNC: 61 U/L — HIGH (ref 0–41)
AUTO DIFF PNL BLD: NEGATIVE — SIGNIFICANT CHANGE UP
BASOPHILS # BLD AUTO: 0.03 K/UL — SIGNIFICANT CHANGE UP (ref 0–0.2)
BASOPHILS NFR BLD AUTO: 0.5 % — SIGNIFICANT CHANGE UP (ref 0–1)
BILIRUB SERPL-MCNC: 0.5 MG/DL — SIGNIFICANT CHANGE UP (ref 0.2–1.2)
BUN SERPL-MCNC: 38 MG/DL — HIGH (ref 10–20)
C-ANCA SER-ACNC: NEGATIVE — SIGNIFICANT CHANGE UP
CALCIUM SERPL-MCNC: 9.5 MG/DL — SIGNIFICANT CHANGE UP (ref 8.4–10.5)
CHLORIDE SERPL-SCNC: 99 MMOL/L — SIGNIFICANT CHANGE UP (ref 98–110)
CO2 SERPL-SCNC: 26 MMOL/L — SIGNIFICANT CHANGE UP (ref 17–32)
CREAT SERPL-MCNC: 2.3 MG/DL — HIGH (ref 0.7–1.5)
EGFR: 37 ML/MIN/1.73M2 — LOW
EOSINOPHIL # BLD AUTO: 0.13 K/UL — SIGNIFICANT CHANGE UP (ref 0–0.7)
EOSINOPHIL NFR BLD AUTO: 2 % — SIGNIFICANT CHANGE UP (ref 0–8)
GLUCOSE SERPL-MCNC: 93 MG/DL — SIGNIFICANT CHANGE UP (ref 70–99)
HCT VFR BLD CALC: 42.5 % — SIGNIFICANT CHANGE UP (ref 42–52)
HGB BLD-MCNC: 13.8 G/DL — LOW (ref 14–18)
IMM GRANULOCYTES NFR BLD AUTO: 0.2 % — SIGNIFICANT CHANGE UP (ref 0.1–0.3)
LYMPHOCYTES # BLD AUTO: 1.79 K/UL — SIGNIFICANT CHANGE UP (ref 1.2–3.4)
LYMPHOCYTES # BLD AUTO: 27.9 % — SIGNIFICANT CHANGE UP (ref 20.5–51.1)
MAGNESIUM SERPL-MCNC: 2 MG/DL — SIGNIFICANT CHANGE UP (ref 1.8–2.4)
MCHC RBC-ENTMCNC: 30.4 PG — SIGNIFICANT CHANGE UP (ref 27–31)
MCHC RBC-ENTMCNC: 32.5 G/DL — SIGNIFICANT CHANGE UP (ref 32–37)
MCV RBC AUTO: 93.6 FL — SIGNIFICANT CHANGE UP (ref 80–94)
MONOCYTES # BLD AUTO: 0.52 K/UL — SIGNIFICANT CHANGE UP (ref 0.1–0.6)
MONOCYTES NFR BLD AUTO: 8.1 % — SIGNIFICANT CHANGE UP (ref 1.7–9.3)
NEUTROPHILS # BLD AUTO: 3.94 K/UL — SIGNIFICANT CHANGE UP (ref 1.4–6.5)
NEUTROPHILS NFR BLD AUTO: 61.3 % — SIGNIFICANT CHANGE UP (ref 42.2–75.2)
NRBC # BLD: 0 /100 WBCS — SIGNIFICANT CHANGE UP (ref 0–0)
P-ANCA SER-ACNC: NEGATIVE — SIGNIFICANT CHANGE UP
PLATELET # BLD AUTO: 156 K/UL — SIGNIFICANT CHANGE UP (ref 130–400)
PMV BLD: 11.1 FL — HIGH (ref 7.4–10.4)
POTASSIUM SERPL-MCNC: 3.8 MMOL/L — SIGNIFICANT CHANGE UP (ref 3.5–5)
POTASSIUM SERPL-SCNC: 3.8 MMOL/L — SIGNIFICANT CHANGE UP (ref 3.5–5)
PROT SERPL-MCNC: 6.5 G/DL — SIGNIFICANT CHANGE UP (ref 6–8)
RBC # BLD: 4.54 M/UL — LOW (ref 4.7–6.1)
RBC # FLD: 13.4 % — SIGNIFICANT CHANGE UP (ref 11.5–14.5)
SODIUM SERPL-SCNC: 139 MMOL/L — SIGNIFICANT CHANGE UP (ref 135–146)
WBC # BLD: 6.42 K/UL — SIGNIFICANT CHANGE UP (ref 4.8–10.8)
WBC # FLD AUTO: 6.42 K/UL — SIGNIFICANT CHANGE UP (ref 4.8–10.8)

## 2024-09-01 PROCEDURE — 93010 ELECTROCARDIOGRAM REPORT: CPT

## 2024-09-01 PROCEDURE — 99232 SBSQ HOSP IP/OBS MODERATE 35: CPT

## 2024-09-01 RX ADMIN — Medication 50 MILLIGRAM(S): at 05:28

## 2024-09-01 RX ADMIN — NIFEDIPINE 60 MILLIGRAM(S): 60 TABLET, FILM COATED, EXTENDED RELEASE ORAL at 05:26

## 2024-09-01 RX ADMIN — CHLORHEXIDINE GLUCONATE 1 APPLICATION(S): 40 SOLUTION TOPICAL at 21:25

## 2024-09-01 RX ADMIN — Medication 5000 UNIT(S): at 05:29

## 2024-09-01 RX ADMIN — Medication 200 MILLIGRAM(S): at 14:37

## 2024-09-01 RX ADMIN — Medication 50 MILLIGRAM(S): at 18:17

## 2024-09-01 RX ADMIN — Medication 5000 UNIT(S): at 21:25

## 2024-09-01 RX ADMIN — Medication 200 MILLIGRAM(S): at 21:24

## 2024-09-01 RX ADMIN — Medication 50 MILLIGRAM(S): at 23:12

## 2024-09-01 RX ADMIN — Medication 0.1 MILLIGRAM(S): at 05:27

## 2024-09-01 RX ADMIN — Medication 50 MILLIGRAM(S): at 12:44

## 2024-09-01 RX ADMIN — Medication 0.1 MILLIGRAM(S): at 18:17

## 2024-09-01 RX ADMIN — Medication 50 MILLIGRAM(S): at 00:02

## 2024-09-01 RX ADMIN — Medication 200 MILLIGRAM(S): at 05:26

## 2024-09-01 NOTE — PROGRESS NOTE ADULT - ASSESSMENT
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   patient had a right humeral fracture and was planned for surgery this Thursday.   On pre-surgery eval, his BP was high so he was sent to the ED.     He denied any pain, and he walks every day around 1 to 2 miles with no symptoms.   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  Reports that he is on amlodipine 10 mg OD and metoprolol T 50 mg BID. But he didn't take them for the past 3 days.     In 2018 he was started on Hyzaar, reports he had elevated BP despite the medication, was started on amlodipine - developed edema, then in 2020 was switched by Dr. Chen to Coreg and Furosemide. Then last time seen by Dr Carson in 2020 for HTN and the plan was to continue same management and add valsartan if BP was still high; but patient was lost to follow up.   Back then Renal Doppler was negative, and reportedly workup forn secondary HTN with Dr. Chne was negative (we don't have them). ECG in 2020 revealed LVH with secondary changes and PVC's.    Patient reports that he was following with his PCP dr Sharp on [facility]. And she told him previously that his creatinine was high but he didn't follow up with a nephrologist.   he rarely checks his BP at home; and when he does it is usually 180-190.   patient is not in pain from the humeral fracture    The patient denies chest pain, dyspnea, orthopnea or PND.   No syncope or falls. No edema.     No previous TTE on the system    # hypertensive emergency (MARCO + elevated trop) in the setting of medication non compliance  # Non oliguric MARCO with proteinuria likely on hypertensive CKD   # Hypertensive heart disease (LVH)    - Daily Weight.   - Strict I&Os. Keep I < O  - nicardipine drip > titrated off.   - labetalol and hydralazine standing   - will not give ACE-i or ARBs given the MARCO  - regarding the workup of secondary HTN; patient doesn't have the previous records ->   f /up aldosterone, renin level, metanephrine urine and serum, TSH  Renal US with duplex renal arteries     MARCO on CKD?   >> trend creatinine.   - Creatinine : 2.5 >> 2.3   - K+ and bicarb within normal limits   - Nephrology consult  - Avoid nephrotoxic agents  - Monitor BUN/creatinine  - workup as above   - no indication for HD now     No need for dvt ppx as ambulatory.     Pending: MARCO/ HTN work up / Renal doppler  Dispo: Home  Plan d/w the patient at bedside.

## 2024-09-01 NOTE — PROGRESS NOTE ADULT - SUBJECTIVE AND OBJECTIVE BOX
24H events:    Patient is a 34y old Male who presents with a chief complaint of   Primary diagnosis of Hypertensive emergency        Today is 5d of hospitalization.       PAST MEDICAL & SURGICAL HISTORY  HTN (hypertension)    Humeral fracture    Known health problems: none      SOCIAL HISTORY:  Social History:      ALLERGIES:  No Known Allergies    MEDICATIONS:  STANDING MEDICATIONS  chlorhexidine 2% Cloths 1 Application(s) Topical daily  cloNIDine 0.1 milliGRAM(s) Oral two times a day  heparin   Injectable 5000 Unit(s) SubCutaneous every 8 hours  hydrALAZINE 50 milliGRAM(s) Oral every 6 hours  hydrochlorothiazide 25 milliGRAM(s) Oral daily  labetalol 200 milliGRAM(s) Oral three times a day  NIFEdipine XL 60 milliGRAM(s) Oral daily    PRN MEDICATIONS  acetaminophen     Tablet .. 650 milliGRAM(s) Oral every 6 hours PRN    VITALS:   T(F): 97.5  HR: 76  BP: 155/84  RR: 18  SpO2: 99%    PHYSICAL EXAM:  HEENT: ADRIÁN             Lymphatic system: No cervical LN   Lungs: Bilateral BS, clear   Cardiovascular: Regular   Gastrointestinal: Soft, Positive BS  Extremities: No clubbing.  Moves extremities.  Full Range of motion   Skin: Warm, intact  Neurological: No motor or sensory deficit       LABS:                        13.0   7.44  )-----------( 141      ( 31 Aug 2024 04:57 )             40.2     08-31    138  |  101  |  29<H>  ----------------------------<  99  3.6   |  24  |  2.5<H>    Ca    8.8      31 Aug 2024 04:57  Mg     2.2     08-31    TPro  6.0  /  Alb  3.9  /  TBili  0.5  /  DBili  x   /  AST  20  /  ALT  21  /  AlkPhos  109  08-31      Urinalysis Basic - ( 31 Aug 2024 04:57 )    Color: x / Appearance: x / SG: x / pH: x  Gluc: 99 mg/dL / Ketone: x  / Bili: x / Urobili: x   Blood: x / Protein: x / Nitrite: x   Leuk Esterase: x / RBC: x / WBC x   Sq Epi: x / Non Sq Epi: x / Bacteria: x                    ASSESSMENT AND PLAN  IMPRESSION:  # hypertensive emergency (MARCO + elevated trop) in the setting of medication non compliance - resolved   # Likely CKD   # Hypertensive heart disease (LVH)    PLAN:    CNS:   - Avoid CNS Depressants     HEENT:   - Oral care.   - Aspiration precautions     PULMONARY:   - On room air    CARDIOVASCULAR:   - Multiple BP meds.   - Echo noted; cardiac MRI  - Cardiology follow up.   - Off cardene drip     GI:   - GI prophylaxis.   - Diet: DASH/TLC    RENAL:   - Creatinine today: 2.5  - K+ and bicarb within normal limits   - Nephrology follow up   - No espinosa      INFECTIOUS DISEASE:   - Monitor WBC  - Trend fever  - no signs or symptoms of infection    HEMATOLOGICAL:   - Monitor H&H  - DVT prophylaxis: heparin SQ    ENDOCRINE:   - Monitor FS  - f up a1c    MUSCULOSKELETAL:  humeral fx pending surgery when stable has cast   - Ambulate as tolerated     Medical floor.       #Med rec: done  #Code status: full      Requested information faxed to number provided.

## 2024-09-01 NOTE — PROGRESS NOTE ADULT - ASSESSMENT
33 y/o male with PMH of early onset HTN (since the age of 26), LVH, admitted for elevated BP.  patient had a right humeral fracture and was planned for surgery on 8/29. On pre-surgery eval, his BP was high so he was sent to the ED. Reports that he is on amlodipine 10 mg OD and metoprolol T 50 mg BID but ran out of amlodipine a few days prior to presentation. currently admitted to MICU for hypertensive emergency.   cr 2.4 stable since admission     need to repeat w/up secondary HTN : sono/ doppler renal arteries/ serum metanephrines/ cortisol /  jorden level not elevated  renal ultrasound reviewed, echogenic kidneys  increase nifedipine to 90 if BP sys BP remains > 140  < 1gram proteinuria no hematuria,  outpt Cr would be helpful / pt likely at baseline renal function   ph at goal   f/u KRISTA/ DNA (neg)/ C3/ C4/ ANCA(neg)/ hepatitis profile / IF/ K/L (neg)   cardiology notes appreciated

## 2024-09-01 NOTE — PROGRESS NOTE ADULT - SUBJECTIVE AND OBJECTIVE BOX
ALENA ABRAMS  34y  Male      Patient is a 34y old  Male who presents with a chief complaint of elevated blood pressure.     INTERVAL HPI/OVERNIGHT EVENTS:      ******************************* REVIEW OF SYSTEMS:**********************************************    All other review of systems negative    *********************** VITALS ******************************************    T(F): 98.2 (09-01-24 @ 12:42)  HR: 70 (09-01-24 @ 12:42) (70 - 87)  BP: 150/79 (09-01-24 @ 12:42) (136/82 - 163/98)  RR: 18 (08-31-24 @ 23:40) (12 - 20)  SpO2: 99% (09-01-24 @ 05:24) (97% - 99%)    08-31-24 @ 07:01  -  09-01-24 @ 07:00  --------------------------------------------------------  IN: 1080 mL / OUT: 1850 mL / NET: -770 mL            08-31-24 @ 07:01  -  09-01-24 @ 07:00  --------------------------------------------------------  IN: 1080 mL / OUT: 1850 mL / NET: -770 mL        ******************************** PHYSICAL EXAM:**************************************************  GENERAL: NAD    PSYCH: no agitation, baseline mentation  HEENT:     NERVOUS SYSTEM:  Alert & Oriented X3,   PULMONARY: OLIVIA, CTA    CARDIOVASCULAR: S1S2 RRR    GI: Soft, NT, ND; BS present.    EXTREMITIES:  2+ Peripheral Pulses, No clubbing, cyanosis, or edema    LYMPH: No lymphadenopathy noted    SKIN: No rashes or lesions      **************************** LABS *******************************************************                          13.8   6.42  )-----------( 156      ( 01 Sep 2024 06:27 )             42.5     09-01    139  |  99  |  38<H>  ----------------------------<  93  3.8   |  26  |  2.3<H>    Ca    9.5      01 Sep 2024 06:27  Mg     2.0     09-01    TPro  6.5  /  Alb  3.9  /  TBili  0.5  /  DBili  x   /  AST  61<H>  /  ALT  67<H>  /  AlkPhos  111  09-01      Urinalysis Basic - ( 01 Sep 2024 06:27 )    Color: x / Appearance: x / SG: x / pH: x  Gluc: 93 mg/dL / Ketone: x  / Bili: x / Urobili: x   Blood: x / Protein: x / Nitrite: x   Leuk Esterase: x / RBC: x / WBC x   Sq Epi: x / Non Sq Epi: x / Bacteria: x        Lactate Trend        CAPILLARY BLOOD GLUCOSE              **************************Active Medications *******************************************  No Known Allergies      acetaminophen     Tablet .. 650 milliGRAM(s) Oral every 6 hours PRN  chlorhexidine 2% Cloths 1 Application(s) Topical daily  cloNIDine 0.1 milliGRAM(s) Oral two times a day  heparin   Injectable 5000 Unit(s) SubCutaneous every 8 hours  hydrALAZINE 50 milliGRAM(s) Oral every 6 hours  hydrochlorothiazide 25 milliGRAM(s) Oral daily  labetalol 200 milliGRAM(s) Oral three times a day  NIFEdipine XL 60 milliGRAM(s) Oral daily      ***************************************************  RADIOLOGY & ADDITIONAL TESTS:    Imaging Personally Reviewed:  [ ] YES  [ ] NO    HEALTH ISSUES - PROBLEM Dx:

## 2024-09-01 NOTE — PROGRESS NOTE ADULT - SUBJECTIVE AND OBJECTIVE BOX
Nephrology Progress Note    ALENA ABRAMS  [id number]  34y  Male    S:  Patient is seen and examined, events over the last 24h noted.    O:  Allergies:  No Known Allergies    Hospital Medications:   MEDICATIONS  (STANDING):  chlorhexidine 2% Cloths 1 Application(s) Topical daily  cloNIDine 0.1 milliGRAM(s) Oral two times a day  heparin   Injectable 5000 Unit(s) SubCutaneous every 8 hours  hydrALAZINE 50 milliGRAM(s) Oral every 6 hours  hydrochlorothiazide 25 milliGRAM(s) Oral daily  labetalol 200 milliGRAM(s) Oral three times a day  NIFEdipine XL 60 milliGRAM(s) Oral daily    MEDICATIONS  (PRN):  acetaminophen     Tablet .. 650 milliGRAM(s) Oral every 6 hours PRN Moderate Pain (4 - 6)    Home Medications:  amLODIPine 10 mg oral tablet: 1 tab(s) orally once a day (26 Aug 2024 18:48)  metoprolol succinate 50 mg oral tablet, extended release: 1 tab(s) orally 2 times a day (26 Aug 2024 18:48)      VITALS:  Daily     Daily   T(F): 98.2 (09-01-24 @ 12:42), Max: 98.2 (09-01-24 @ 12:42)  HR: 70 (09-01-24 @ 12:42)  BP: 150/79 (09-01-24 @ 12:42)  RR: 18 (08-31-24 @ 23:40)  SpO2: 99% (09-01-24 @ 05:24)  Wt(kg): --  I&O's Detail    31 Aug 2024 07:01  -  01 Sep 2024 07:00  --------------------------------------------------------  IN:    Oral Fluid: 1080 mL  Total IN: 1080 mL    OUT:    Voided (mL): 1850 mL  Total OUT: 1850 mL    Total NET: -770 mL        I&O's Summary    31 Aug 2024 07:01  -  01 Sep 2024 07:00  --------------------------------------------------------  IN: 1080 mL / OUT: 1850 mL / NET: -770 mL          PHYSICAL EXAM:  Gen: resting comfortably      LABS:      09-01    139  |  99  |  38<H>  ----------------------------<  93  3.8   |  26  |  2.3<H>    Ca    9.5      01 Sep 2024 06:27  Mg     2.0     09-01    TPro  6.5  /  Alb  3.9  /  TBili  0.5  /  DBili      /  AST  61<H>  /  ALT  67<H>  /  AlkPhos  111  09-01    Phosphorus: 4.0 mg/dL (08-28-24 @ 04:55)                            13.8   6.42  )-----------( 156      ( 01 Sep 2024 06:27 )             42.5     Mean Cell Volume: 93.6 fL (09-01-24 @ 06:27)          Urine Studies:  Protein, Urine: 30 mg/dL (08-29-24 @ 09:37)  White Blood Cell - Urine: 1 /HPF (08-29-24 @ 09:37)  Red Blood Cell - Urine: 0 /HPF (08-29-24 @ 09:37)  Creatinine, Random Urine: 65 mg/dL (08-29 @ 09:37)  Protein/Creatinine Ratio Calculation: 0.4 Ratio (08-29 @ 09:37)    Creatinine trend:  Creatinine: 2.3 mg/dL (09-01-24 @ 06:27)  Creatinine: 2.5 mg/dL (08-31-24 @ 04:57)  Creatinine: 2.3 mg/dL (08-30-24 @ 05:22)  Creatinine: 2.5 mg/dL (08-29-24 @ 04:45)    Double Stranded DNA Antibody: <1 IU/mL (08-30-24 @ 05:22)  Cytoplasmic (c-ANCA) Antibody: Negative (08-30-24 @ 05:22)  Perinuclear (p-ANCA) Antibody: Negative (08-30-24 @ 05:22)      US Renal:   ACC: [id number] EXAM:  US KIDNEY(S)   ORDERED BY: ALEXIA ARMANIOUS     PROCEDURE DATE:  08/30/2024          INTERPRETATION:  CLINICAL INFORMATION: Kidney injury    COMPARISON: None available.    TECHNIQUE: Sonography of the kidneys and bladder.    FINDINGS:  Right kidney: 10.4 cm. No renal mass, hydronephrosis or calculi.    Left kidney: 8.8 cm. No renal mass, hydronephrosis or calculi. Left upper   pole 1.8 cm cyst.    Urinary bladder: Urinary bladder volume of 154 cc. Patient unable to void   during examination.    IMPRESSION:    Mildly echogenic kidneys. No hydronephrosis bilaterally.    Urinary bladder volume 154 cc.    --- End of Report ---            ELLIOT LANDAU MD; Attending Radiologist  This document has been electronically signed. Aug 30 2024 12:31PM (08-30-24 @ 12:27)

## 2024-09-02 LAB
ALBUMIN SERPL ELPH-MCNC: 4.3 G/DL — SIGNIFICANT CHANGE UP (ref 3.5–5.2)
ALP SERPL-CCNC: 108 U/L — SIGNIFICANT CHANGE UP (ref 30–115)
ALT FLD-CCNC: 124 U/L — HIGH (ref 0–41)
ANION GAP SERPL CALC-SCNC: 15 MMOL/L — HIGH (ref 7–14)
AST SERPL-CCNC: 92 U/L — HIGH (ref 0–41)
BASOPHILS # BLD AUTO: 0.04 K/UL — SIGNIFICANT CHANGE UP (ref 0–0.2)
BASOPHILS NFR BLD AUTO: 0.6 % — SIGNIFICANT CHANGE UP (ref 0–1)
BILIRUB SERPL-MCNC: 0.6 MG/DL — SIGNIFICANT CHANGE UP (ref 0.2–1.2)
BUN SERPL-MCNC: 36 MG/DL — HIGH (ref 10–20)
CALCIUM SERPL-MCNC: 9.7 MG/DL — SIGNIFICANT CHANGE UP (ref 8.4–10.5)
CHLORIDE SERPL-SCNC: 99 MMOL/L — SIGNIFICANT CHANGE UP (ref 98–110)
CO2 SERPL-SCNC: 25 MMOL/L — SIGNIFICANT CHANGE UP (ref 17–32)
CREAT SERPL-MCNC: 2.3 MG/DL — HIGH (ref 0.7–1.5)
EGFR: 37 ML/MIN/1.73M2 — LOW
EOSINOPHIL # BLD AUTO: 0.15 K/UL — SIGNIFICANT CHANGE UP (ref 0–0.7)
EOSINOPHIL NFR BLD AUTO: 2.2 % — SIGNIFICANT CHANGE UP (ref 0–8)
GLUCOSE SERPL-MCNC: 91 MG/DL — SIGNIFICANT CHANGE UP (ref 70–99)
HCT VFR BLD CALC: 44 % — SIGNIFICANT CHANGE UP (ref 42–52)
HGB BLD-MCNC: 14.3 G/DL — SIGNIFICANT CHANGE UP (ref 14–18)
IMM GRANULOCYTES NFR BLD AUTO: 0.3 % — SIGNIFICANT CHANGE UP (ref 0.1–0.3)
INTERPRETATION SERPL IFE-IMP: SIGNIFICANT CHANGE UP
LYMPHOCYTES # BLD AUTO: 1.62 K/UL — SIGNIFICANT CHANGE UP (ref 1.2–3.4)
LYMPHOCYTES # BLD AUTO: 24.3 % — SIGNIFICANT CHANGE UP (ref 20.5–51.1)
MAGNESIUM SERPL-MCNC: 2.1 MG/DL — SIGNIFICANT CHANGE UP (ref 1.8–2.4)
MCHC RBC-ENTMCNC: 30 PG — SIGNIFICANT CHANGE UP (ref 27–31)
MCHC RBC-ENTMCNC: 32.5 G/DL — SIGNIFICANT CHANGE UP (ref 32–37)
MCV RBC AUTO: 92.4 FL — SIGNIFICANT CHANGE UP (ref 80–94)
MONOCYTES # BLD AUTO: 0.5 K/UL — SIGNIFICANT CHANGE UP (ref 0.1–0.6)
MONOCYTES NFR BLD AUTO: 7.5 % — SIGNIFICANT CHANGE UP (ref 1.7–9.3)
NEUTROPHILS # BLD AUTO: 4.34 K/UL — SIGNIFICANT CHANGE UP (ref 1.4–6.5)
NEUTROPHILS NFR BLD AUTO: 65.1 % — SIGNIFICANT CHANGE UP (ref 42.2–75.2)
NRBC # BLD: 0 /100 WBCS — SIGNIFICANT CHANGE UP (ref 0–0)
PLATELET # BLD AUTO: 171 K/UL — SIGNIFICANT CHANGE UP (ref 130–400)
PMV BLD: 10.9 FL — HIGH (ref 7.4–10.4)
POTASSIUM SERPL-MCNC: 4 MMOL/L — SIGNIFICANT CHANGE UP (ref 3.5–5)
POTASSIUM SERPL-SCNC: 4 MMOL/L — SIGNIFICANT CHANGE UP (ref 3.5–5)
PROT SERPL-MCNC: 6.6 G/DL — SIGNIFICANT CHANGE UP (ref 6–8)
RBC # BLD: 4.76 M/UL — SIGNIFICANT CHANGE UP (ref 4.7–6.1)
RBC # FLD: 13.3 % — SIGNIFICANT CHANGE UP (ref 11.5–14.5)
SODIUM SERPL-SCNC: 139 MMOL/L — SIGNIFICANT CHANGE UP (ref 135–146)
WBC # BLD: 6.67 K/UL — SIGNIFICANT CHANGE UP (ref 4.8–10.8)
WBC # FLD AUTO: 6.67 K/UL — SIGNIFICANT CHANGE UP (ref 4.8–10.8)

## 2024-09-02 PROCEDURE — 93010 ELECTROCARDIOGRAM REPORT: CPT

## 2024-09-02 PROCEDURE — 99232 SBSQ HOSP IP/OBS MODERATE 35: CPT

## 2024-09-02 RX ORDER — NIFEDIPINE 60 MG/1
90 TABLET, FILM COATED, EXTENDED RELEASE ORAL DAILY
Refills: 0 | Status: DISCONTINUED | OUTPATIENT
Start: 2024-09-03 | End: 2024-09-04

## 2024-09-02 RX ORDER — NIFEDIPINE 60 MG/1
30 TABLET, FILM COATED, EXTENDED RELEASE ORAL ONCE
Refills: 0 | Status: COMPLETED | OUTPATIENT
Start: 2024-09-02 | End: 2024-09-02

## 2024-09-02 RX ORDER — NIFEDIPINE 60 MG/1
60 TABLET, FILM COATED, EXTENDED RELEASE ORAL ONCE
Refills: 0 | Status: COMPLETED | OUTPATIENT
Start: 2024-09-02 | End: 2024-09-02

## 2024-09-02 RX ORDER — HYDRALAZINE HCL 50 MG
1 TABLET ORAL
Qty: 0 | Refills: 0 | DISCHARGE
Start: 2024-09-02

## 2024-09-02 RX ORDER — CLONIDINE HCL 0.2 MG
1 TABLET ORAL
Qty: 0 | Refills: 0 | DISCHARGE
Start: 2024-09-02

## 2024-09-02 RX ORDER — NIFEDIPINE 60 MG/1
1 TABLET, FILM COATED, EXTENDED RELEASE ORAL
Qty: 0 | Refills: 0 | DISCHARGE
Start: 2024-09-02

## 2024-09-02 RX ORDER — HYDROCHLOROTHIAZIDE 12.5 MG/1
1 CAPSULE ORAL
Qty: 0 | Refills: 0 | DISCHARGE
Start: 2024-09-02

## 2024-09-02 RX ADMIN — Medication 5000 UNIT(S): at 05:14

## 2024-09-02 RX ADMIN — CHLORHEXIDINE GLUCONATE 1 APPLICATION(S): 40 SOLUTION TOPICAL at 22:44

## 2024-09-02 RX ADMIN — Medication 200 MILLIGRAM(S): at 22:43

## 2024-09-02 RX ADMIN — Medication 5000 UNIT(S): at 15:32

## 2024-09-02 RX ADMIN — Medication 50 MILLIGRAM(S): at 18:49

## 2024-09-02 RX ADMIN — Medication 50 MILLIGRAM(S): at 05:12

## 2024-09-02 RX ADMIN — Medication 0.1 MILLIGRAM(S): at 05:12

## 2024-09-02 RX ADMIN — Medication 200 MILLIGRAM(S): at 15:35

## 2024-09-02 RX ADMIN — Medication 0.1 MILLIGRAM(S): at 18:49

## 2024-09-02 RX ADMIN — Medication 50 MILLIGRAM(S): at 11:47

## 2024-09-02 RX ADMIN — Medication 200 MILLIGRAM(S): at 05:12

## 2024-09-02 RX ADMIN — NIFEDIPINE 60 MILLIGRAM(S): 60 TABLET, FILM COATED, EXTENDED RELEASE ORAL at 20:16

## 2024-09-02 RX ADMIN — NIFEDIPINE 30 MILLIGRAM(S): 60 TABLET, FILM COATED, EXTENDED RELEASE ORAL at 15:35

## 2024-09-02 RX ADMIN — NIFEDIPINE 60 MILLIGRAM(S): 60 TABLET, FILM COATED, EXTENDED RELEASE ORAL at 05:12

## 2024-09-02 RX ADMIN — Medication 5000 UNIT(S): at 22:44

## 2024-09-02 NOTE — PROGRESS NOTE ADULT - ASSESSMENT
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   patient had a right humeral fracture and was planned for surgery this Thursday.   On pre-surgery eval, his BP was high so he was sent to the ED.     He denied any pain, and he walks every day around 1 to 2 miles with no symptoms.   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  Reports that he is on amlodipine 10 mg OD and metoprolol T 50 mg BID. But he didn't take them for the past 3 days.     In 2018 he was started on Hyzaar, reports he had elevated BP despite the medication, was started on amlodipine - developed edema, then in 2020 was switched by Dr. Chen to Coreg and Furosemide. Then last time seen by Dr Carson in 2020 for HTN and the plan was to continue same management and add valsartan if BP was still high; but patient was lost to follow up.   Back then Renal Doppler was negative, and reportedly workup forn secondary HTN with Dr. Chen was negative (we don't have them). ECG in 2020 revealed LVH with secondary changes and PVC's.    Patient reports that he was following with his PCP dr Sharp on [facility]. And she told him previously that his creatinine was high but he didn't follow up with a nephrologist.   he rarely checks his BP at home; and when he does it is usually 180-190.   patient is not in pain from the humeral fracture    The patient denies chest pain, dyspnea, orthopnea or PND.   No syncope or falls. No edema.     No previous TTE on the system    # hypertensive emergency (MARCO + elevated trop) in the setting of medication non compliance  # Non oliguric MARCO with proteinuria likely on hypertensive CKD   # Hypertensive heart disease (LVH)  - nicardipine drip > titrated off.   - labetalol and hydralazine standing   - consider increasing Nifedipine.   - will not give ACE-i or ARBs given the MARCO?   - regarding the workup of secondary HTN; patient doesn't have the previous records ->   f /up  metanephrine urine and serum,  duplex renal arteries  c-ANCA, p-ANCA >> neg      MARCO on CKD?   >> trend creatinine.   - Creatinine : 2.5 >> 2.3   - K+ and bicarb within normal limits   - Nephrology consult  - Avoid nephrotoxic agents   - no indication for HD now     No need for dvt ppx as ambulatory.   Pt would benefit from [facility] aptmt.     Pending: MARCO/ HTN work up / Renal doppler  Dispo: Home  Plan d/w the patient at bedside.

## 2024-09-02 NOTE — DISCHARGE NOTE PROVIDER - PROVIDER TOKENS
PROVIDER:[TOKEN:[[id number]],FOLLOWUP:[1-3 days]],PROVIDER:[TOKEN:[[id number]],FOLLOWUP:[1 week]] PROVIDER:[TOKEN:[[id number]],FOLLOWUP:[1-3 days]],PROVIDER:[TOKEN:[[id number]],FOLLOWUP:[1 week]],PROVIDER:[TOKEN:[[id number]]] PROVIDER:[TOKEN:[[id number]],FOLLOWUP:[1 week]],PROVIDER:[TOKEN:[[id number]],SCHEDULEDAPPT:[09/06/2024],SCHEDULEDAPPTTIME:[02:30 PM],ESTABLISHEDPATIENT:[T]],PROVIDER:[TOKEN:[[id number]],FOLLOWUP:[1 week]]

## 2024-09-02 NOTE — DISCHARGE NOTE PROVIDER - NSDCFUSCHEDAPPT_GEN_ALL_CORE_FT
Emmy Rodriguez  [facility] PreAdmits  Scheduled Appointment: 09/13/2024    Emmy Rodriguez  [facility]  [address]  Scheduled Appointment: 09/13/2024

## 2024-09-02 NOTE — DISCHARGE NOTE PROVIDER - NSDCFUADDAPPT_GEN_ALL_CORE_FT
[] follow up with your PCP within 1-3 days of discharge   [] follow up with nephrology within 1 week of discharge   [] follow up with your orthopedic surgeon regarding right humeral fracture  [] follow up with your PCP  [] follow up with nephrology regarding kidney function  [] follow up with cardiology regarding hypertrophic cardiomyopathy  [] follow up with your orthopedic surgeon regarding right humeral fracture  [] Follow up with PCP   [] follow up with nephrology regarding kidney function  [] follow up with cardiology regarding hypertrophic cardiomyopathy  [] follow up with your orthopedic surgeon regarding right humeral fracture

## 2024-09-02 NOTE — DISCHARGE NOTE PROVIDER - ATTENDING DISCHARGE PHYSICAL EXAMINATION:
T(F): 97.6 (09-04-24 @ 05:00), Max: 97.8 (09-03-24 @ 14:19)  HR: 57 (09-04-24 @ 08:54) (57 - 71)  BP: 132/75 (09-04-24 @ 08:54) (132/75 - 176/98)  RR: 18 (09-04-24 @ 05:00) (18 - 20)  SpO2: 100% (09-04-24 @ 05:00) (99% - 100%)  Physical exam:   constitutional NAD, AAOX3, Respiratory  lungs CTA, CVS heart RRR, GI: abdomen Soft NT, ND, BS+, skin: intact  neuro exam Motor, sensory and CN normal, no deficit , left arm in sling,

## 2024-09-02 NOTE — PROGRESS NOTE ADULT - ASSESSMENT
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 patient had a right humeral fracture and was planned for surgery this Thursday.  On pre-surgery eval, his BP was high so he was sent to the ED.     He denied any pain, and he walks every day around 1 to 2 miles with no symptoms. 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 But he didn't take them for the past 3 days. In 2018 he was started on Hyzaar, reports he had elevated BP despite the medication, was started on amlodipine - developed edema, then in 2020 was switched by Dr. Chen to Coreg and Furosemide. Then last time seen by Dr Carson in 2020 for HTN and the plan was to continue same management and add valsartan if BP was still high; but patient was lost to follow up. Back then Renal Doppler was negative, and reportedly workup forn secondary HTN with Dr. Chen was negative (we don't have them). ECG in 2020 revealed LVH with secondary changes and PVC's.    Patient reports that he was following with his PCP dr Sharp on [facility]. And she told him previously that his creatinine was high but he didn't follow up with a nephrologist. he rarely checks his BP at home; and when he does it is usually 180-190. patient is not in pain from the humeral fracture. The patient denies chest pain, dyspnea, orthopnea or PND.   No syncope or falls. No edema. No previous TTE on the system    # hypertensive emergency (MARCO + elevated trop) in the setting of medication non compliance  # Non oliguric MARCO with proteinuria likely on hypertensive CKD   # Hypertensive heart disease (LVH)  - Daily Weight.   - Strict I&Os. Keep I < O  - s/p nicardipine gtt   - hold ACE-i or ARBs given the MARCO for now   - Cr 2.3-2.7 likely baseline fxn  - nephro following  - secondary HTN w/u: jorden 24.4, renin 16, A/R 0.7, AM cortisol 16, TSH 3.99, dsDNA/C3/C4 neg, hepatitis neg, k/l ratio neg  - US renal: echogenic kidneys  - f/u KRISTA, metanephrine (serum/urine)  - f/u US renal duplex   - c/w clonidine 0.1mg BID; labetalol 200mg TID, hydralazine 50mg q6h, HCTZ 25mg qd  - increase procardia 90 mg qd     # MARCO on CKD? - resolved   - Creatinine : 2.5 >> 2.3   - K+ and bicarb within normal limits   - Nephrology following  - Avoid nephrotoxic agents  - Monitor BUN/creatinine  - workup as above   - no indication for HD now     ---------------------  DVT ppx: ambulation  Diet: DASH  GI ppx/Bowel regimen: not indicated  Activity: AAT  GOC: full code  Dispo: pending 2ndary HTN workup  #Summary/Handoff:  - f/u US renal doppler, metanephrines/KRISTA, monitor BP

## 2024-09-02 NOTE — DISCHARGE NOTE PROVIDER - DISCHARGE DATE
78 yo F with ESRD s/p renal transplant (2019, now off HD), left AKA, BK viremia, HTN, breast ca s/p lumpectomy (completed Tamoxifen 03/2023), DVT (off Eliquis), HLD, gout presenting 3/1 with left ICH (ICH score 4) likely 2/2 amyloid angiopathy s/p left craniectomy  and evacuation as well as EVD placement and removal (3/7).   initial CTH3/1  with 8.9cm left frontal IPH with IVH .09cm rightward shift   3/2 CTH s/p L hmeicrani and resection of IPH. stable   3/5 CTH post op changes. some reorption of post op pneumocephalus.    s/p trach/peg 3/8/24   3/8 CTH L frontal craniectomy.  L MCA hemorrhage and infarct ; still IVH.   3/10 with + UA  A1c 5.7 LDL 46   TTE done 3/2: LA is severely dilated    Urine culture grew positive for klebsiella and enterococcus  3/9 EEG no seiuzres since 3/7;  risk of seiuzre from L parietal region. mod to severe diffuse cerebral dysfunction   Transferred to [facility] 3/11 for continued management.  3/12 CTH   sterotactic imaging of L frontal crani for hemorrhagic L MCA ifnarct. L frontal temporal pseudmeningocele  noted. no hcange since 3/8   o/e awake, no verbal, not followiing. L gaze pref  some minimal withdrawal to noxious RLE and LUE.  s/p trach /vent     Impression: L ICH possible 2/2  CAA but hemorrhagic infarct also needs to be considered.    - plan for cranioplasty 4/2   - had recent CTH 3/12.  would repeat EEG at this time given high risk for seiuzres   - tolerating CPAP at times   - difficult to determine IPH 2/2 CAA without MRI to look at SWI or GRE sequeneses for hemosiderin deposition  - never had vessel imaging. consider CTA H/N   - no AC/AP. dvt ppx okay  - briviact 100mg TID  and vimpat 200mg BID for seiuzre ppx   - infectious workup. on meropenum.  this can lower seiuzre threshold   - immunosuppression on tacrolimus and prednisone.  ppx bactrim     -telemetry   - PT/OT   - check FS, glucose control <180  - GI/DVT ppx   - GOC with family.  currenlty full code; in terms of functional meaningful recovery the likelihood is low.  patient will require 24hr care and likely be bedbound at this time.    consider recalling palliative  Dieter Wilkinson MD  Vascular Neurology  Office: [phone]  02-Sep-2024

## 2024-09-02 NOTE — PROGRESS NOTE ADULT - ASSESSMENT
33 y/o male with PMH of early onset HTN (since the age of 26), LVH, admitted for elevated BP.  patient had a right humeral fracture and was planned for surgery on 8/29. On pre-surgery eval, his BP was high so he was sent to the ED. Reports that he is on amlodipine 10 mg OD and metoprolol T 50 mg BID but ran out of amlodipine a few days prior to presentation. currently admitted to MICU for hypertensive emergency.   cr 2.4 stable since admission     need to repeat w/up secondary HTN : sono/ doppler renal arteries/ serum metanephrines/ cortisol /  jorden level not elevated  renal ultrasound reviewed, echogenic kidneys  increase nifedipine to 90 if BP sys BP remains > 140  < 1gram proteinuria no hematuria,  outpt Cr would be helpful / pt likely at baseline renal function   ph at goal    DNA (neg)/ C3/ C4 normal / ANCA(neg)/ hepatitis profile / IF/ K/L (neg)   cardiology notes appreciated

## 2024-09-02 NOTE — DISCHARGE NOTE PROVIDER - CARE PROVIDERS DIRECT ADDRESSES
,[email or web address]

## 2024-09-02 NOTE — PROGRESS NOTE ADULT - SUBJECTIVE AND OBJECTIVE BOX
Patient is a 34y old Male who presents with a chief complaint of HTN emergency (02 Sep 2024 10:40)    Today is hospital day 6    Overnight events/Interval History:  - No acute overnight events  - At bedside, patient has been sleeping and eating well. Breathing comfortably on room air. Denies fevers, chills, SOB, chest pain, N/V/D/C, abdominal pain.    ROS:  - All ROS is negative unless indicated in HPI    Code Status:    ALLERGIES:  No Known Allergies    MEDICATIONS:  STANDING MEDICATIONS  chlorhexidine 2% Cloths 1 Application(s) Topical daily  cloNIDine 0.1 milliGRAM(s) Oral two times a day  heparin   Injectable 5000 Unit(s) SubCutaneous every 8 hours  hydrALAZINE 50 milliGRAM(s) Oral every 6 hours  hydrochlorothiazide 25 milliGRAM(s) Oral daily  labetalol 200 milliGRAM(s) Oral three times a day  NIFEdipine XL 30 milliGRAM(s) Oral once    PRN MEDICATIONS      VITALS LAST 24H:  Vital Signs Last 24 Hrs  T(C): 36.9 (02 Sep 2024 11:20), Max: 36.9 (02 Sep 2024 11:20)  T(F): 98.5 (02 Sep 2024 11:20), Max: 98.5 (02 Sep 2024 11:20)  HR: 74 (02 Sep 2024 11:20) (70 - 89)  BP: 165/98 (02 Sep 2024 11:20) (147/77 - 165/107)  BP(mean): 87 (02 Sep 2024 06:30) (87 - 105)  RR: 18 (02 Sep 2024 11:20) (18 - 18)  SpO2: 98% (02 Sep 2024 11:20) (95% - 98%)        PHYSICAL EXAM:  GENERAL: NAD, lying in bed comfortably  HEENT:  EOMI, Moist mucous membranes  CHEST/LUNG: Clear to auscultation bilaterally; normal respiratory effort, no coughing, wheezes, crackles, or rales.  HEART: Regular rate and rhythm  ABDOMEN: Soft, nontender, nondistended, Bowel sounds present  EXTREMITIES:  2+ Peripheral Pulses, brisk capillary refill. No lower extremity edema, clubbing, cyanosis bilaterally  NERVOUS SYSTEM:  A&Ox3, no focal deficits   SKIN: No rashes or lesions    LABS:                        14.3   6.67  )-----------( 171      ( 02 Sep 2024 06:59 )             44.0     09-02    139  |  99  |  36<H>  ----------------------------<  91  4.0   |  25  |  2.3<H>    Ca    9.7      02 Sep 2024 06:59  Mg     2.1     09-02    TPro  6.6  /  Alb  4.3  /  TBili  0.6  /  DBili  x   /  AST  92<H>  /  ALT  124<H>  /  AlkPhos  108  09-02      Urinalysis Basic - ( 02 Sep 2024 06:59 )    Color: x / Appearance: x / SG: x / pH: x  Gluc: 91 mg/dL / Ketone: x  / Bili: x / Urobili: x   Blood: x / Protein: x / Nitrite: x   Leuk Esterase: x / RBC: x / WBC x   Sq Epi: x / Non Sq Epi: x / Bacteria: x                RADIOLOGY:  CXR      CT

## 2024-09-02 NOTE — PROGRESS NOTE ADULT - SUBJECTIVE AND OBJECTIVE BOX
Nephrology progress note    Patient was seen and examined, events over the last 24 h noted .    Allergies:  No Known Allergies    Hospital Medications:   MEDICATIONS  (STANDING):  chlorhexidine 2% Cloths 1 Application(s) Topical daily  cloNIDine 0.1 milliGRAM(s) Oral two times a day  heparin   Injectable 5000 Unit(s) SubCutaneous every 8 hours  hydrALAZINE 50 milliGRAM(s) Oral every 6 hours  hydrochlorothiazide 25 milliGRAM(s) Oral daily  labetalol 200 milliGRAM(s) Oral three times a day  NIFEdipine XL 30 milliGRAM(s) Oral once        VITALS:  T(F): 98.5 (09-02-24 @ 11:20), Max: 98.5 (09-02-24 @ 11:20)  HR: 74 (09-02-24 @ 11:20)  BP: 165/98 (09-02-24 @ 11:20)  RR: 18 (09-02-24 @ 11:20)  SpO2: 98% (09-02-24 @ 11:20)  Wt(kg): --    08-31 @ 07:01  -  09-01 @ 07:00  --------------------------------------------------------  IN: 1080 mL / OUT: 1850 mL / NET: -770 mL          PHYSICAL EXAM:  Constitutional: NAD  HEENT: anicteric sclera, oropharynx clear, MMM  Neck: No JVD  Respiratory: CTAB, no wheezes, rales or rhonchi  Cardiovascular: S1, S2, RRR  Gastrointestinal: BS+, soft, NT/ND  Extremities: No cyanosis or clubbing. No peripheral edema  :  No espinosa.   Skin: No rashes    LABS:  09-02    139  |  99  |  36<H>  ----------------------------<  91  4.0   |  25  |  2.3<H>    Ca    9.7      02 Sep 2024 06:59  Mg     2.1     09-02    TPro  6.6  /  Alb  4.3  /  TBili  0.6  /  DBili      /  AST  92<H>  /  ALT  124<H>  /  AlkPhos  108  09-02                          14.3   6.67  )-----------( 171      ( 02 Sep 2024 06:59 )             44.0       Urine Studies:  Urinalysis Basic - ( 02 Sep 2024 06:59 )    Color:  / Appearance:  / SG:  / pH:   Gluc: 91 mg/dL / Ketone:   / Bili:  / Urobili:    Blood:  / Protein:  / Nitrite:    Leuk Esterase:  / RBC:  / WBC    Sq Epi:  / Non Sq Epi:  / Bacteria:       Creatinine, Random Urine: 65 mg/dL (08-29 @ 09:37)  Protein/Creatinine Ratio Calculation: 0.4 Ratio (08-29 @ 09:37)  Sodium, Random Urine: 69.0 mmoL/L (08-27 @ 20:13)  Creatinine, Random Urine: 46 mg/dL (08-27 @ 20:13)  Protein/Creatinine Ratio Calculation: 1.0 Ratio (08-27 @ 20:13)  Osmolality, Random Urine: 268 mos/kg (08-27 @ 20:13)  Potassium, Random Urine: 9 mmol/L (08-27 @ 20:13)    RADIOLOGY & ADDITIONAL STUDIES:

## 2024-09-02 NOTE — PROGRESS NOTE ADULT - SUBJECTIVE AND OBJECTIVE BOX
ALENA ABRMAS  34y  Male      Patient is a 34y old  Male who presents with a chief complaint of HTN emergency.      INTERVAL HPI/OVERNIGHT EVENTS:      ******************************* REVIEW OF SYSTEMS:**********************************************  feeling better.  All other review of systems negative    *********************** VITALS ******************************************    T(F): 98.5 (09-02-24 @ 11:20)  HR: 74 (09-02-24 @ 11:20) (72 - 89)  BP: 165/98 (09-02-24 @ 11:20) (147/77 - 165/107)  RR: 18 (09-02-24 @ 11:20) (18 - 18)  SpO2: 98% (09-02-24 @ 11:20) (95% - 98%)            ******************************** PHYSICAL EXAM:**************************************************  GENERAL: NAD    PSYCH: no agitation, baseline mentation  HEENT:     NERVOUS SYSTEM:  Alert & Oriented X3,   PULMONARY: OLIVIA, CTA    CARDIOVASCULAR: S1S2 RRR    GI: Soft, NT, ND; BS present.    EXTREMITIES:  2+ Peripheral Pulses, No clubbing, cyanosis, or edema    LYMPH: No lymphadenopathy noted    SKIN: No rashes or lesions      **************************** LABS *******************************************************                          14.3   6.67  )-----------( 171      ( 02 Sep 2024 06:59 )             44.0     09-02    139  |  99  |  36<H>  ----------------------------<  91  4.0   |  25  |  2.3<H>    Ca    9.7      02 Sep 2024 06:59  Mg     2.1     09-02    TPro  6.6  /  Alb  4.3  /  TBili  0.6  /  DBili  x   /  AST  92<H>  /  ALT  124<H>  /  AlkPhos  108  09-02      Urinalysis Basic - ( 02 Sep 2024 06:59 )    Color: x / Appearance: x / SG: x / pH: x  Gluc: 91 mg/dL / Ketone: x  / Bili: x / Urobili: x   Blood: x / Protein: x / Nitrite: x   Leuk Esterase: x / RBC: x / WBC x   Sq Epi: x / Non Sq Epi: x / Bacteria: x        Lactate Trend        CAPILLARY BLOOD GLUCOSE              **************************Active Medications *******************************************  No Known Allergies      chlorhexidine 2% Cloths 1 Application(s) Topical daily  cloNIDine 0.1 milliGRAM(s) Oral two times a day  heparin   Injectable 5000 Unit(s) SubCutaneous every 8 hours  hydrALAZINE 50 milliGRAM(s) Oral every 6 hours  hydrochlorothiazide 25 milliGRAM(s) Oral daily  labetalol 200 milliGRAM(s) Oral three times a day  NIFEdipine XL 30 milliGRAM(s) Oral once      ***************************************************  RADIOLOGY & ADDITIONAL TESTS:    Imaging Personally Reviewed:  [ ] YES  [ ] NO    HEALTH ISSUES - PROBLEM Dx:

## 2024-09-02 NOTE — DISCHARGE NOTE PROVIDER - NPI NUMBER (FOR SYSADMIN USE ONLY) :
[[id number] [[id number]],[[id number] [[id number]]

## 2024-09-02 NOTE — DISCHARGE NOTE PROVIDER - HOSPITAL COURSE
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   patient had a right humeral fracture and was planned for surgery this Thursday. On pre-surgery eval, his BP was high so he was sent to the ED.   Yesterday, he came to the ED but then left AMA cause he didn't have his phone with him. He came back today. He denied any pain, and he walks every day around 1 to 2 miles with no symptoms. 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 Reports that he is on amlodipine 10 mg OD and metoprolol T 50 mg BID. But he didn't take them for the past 3 days.     In 2018 he was started on Hyzaar, reports he had elevated BP despite the medication, was started on amlodipine - developed edema, then in 2020 was switched by Dr. Chen to Coreg and Furosemide. Then last time seen by Dr Carson in 2020 for HTN and the plan was to continue same management and add valsartan if BP was still high; but patient was lost to follow up. Back then Renal Doppler was negative, and reportedly workup forn secondary HTN with Dr. Chen was negative (we don't have them). ECG in 2020 revealed LVH with secondary changes and PVC's. Patient reports that he was following with his PCP dr Sharp on [facility]. And she told him previously that his creatinine was high but he didn't follow up with a nephrologist. He rarely checks his BP at home; and when he does it is usually 180-190.   patient is not in pain from the humeral fracture    The patient denies chest pain, dyspnea, orthopnea or PND. No syncope or falls. No edema. No previous TTE on the system  Family Hx: Father : HTN starting age 55 y. Siblings: sister and bother younger than him): no BP    In the ED :   Vitals: · BP  224/ 137 mm Hg · Heart Rate	96 /min · Respiration Rate 16 /min · Temp 36.9 C · SpO2 98   Labs: trop 28 / CBC unremarkable  / creat 2.6   given in the ed labetalol 10 and started on nicardipine drip  (27 Aug 2024 17:18)    Hospital Course:  Patient admitted to CCU for HTN emergency (/137 w/ elevated troponin and MARCO). Started on nicardipine drip, c/w labetalol 100mg tid and hydralazine 25 mg TID. TTE w/ LVH and EF nl. Nephro consulted. Workup for secondary HTN done as patient doesn't have previous records. TSH 3.99, Aldosterone 24.4, Renin 16, A/R 0.7, dsDNA neg, C3/C4 neg, hepatitis panel neg, IF K/L neg, AM cortisol 16.3, Urine w/ <1g proteinuria, no hematuria. Metanephrines urine/serum pending. Renal US with mildly echogenic kidneys, needs US renal w/ doppler. Nitro drip weaned and patient DGTF. BP meds adjusted PRN.   Patient also with mild transaminitis (AlkPhos 108, AST 92, ), asymptomatic.   Patient worried about staying in hospital for too long and wanted to follow up outpatient regarding secondary HTN workup. We discussed monitoring BP at home and taking medications as well as following a low salt diet and patient agreed to comply.     Discussion of discharge plan of care, including discharge diagnoses, medication reconciliation, and follow-ups was conducted with Dr. Mei on 9/2/24 at 10AM and discharge was approved.      Important Medication Changes and Reason:  [] clonidine 0.1 mg PO BID for hypertension  [] hydralazine 50mg PO q6h for hypertension  [] HCTZ 25mg PO qd for hypertension  [] labetalol 200mg PO TID for hypertension  [] nifedipine XL 60mg PO qd for hypertension     Active or Pending Issues Requiring Follow-up:  [] repeat LFTs in 1-3 days of discharge  [] US renal w/ doppler to evaluate for renal artery stenosis   [] f/u metanephrines (serum and urine), KRISTA for secondary hypertension workup  [] follow up with your PCP to monitor BP, follow up on secondary hypertension workup, and LFTs (monitor transaminitis)   [] follow up with nephrology regarding kidney function and secondary hypertension workup  [] follow up with your orthopedic surgeon regarding right humeral fracture     Discharge Diagnoses:  [] Hypertensive emergency   [] Transaminitis   [] Right humeral fracture          HPI:  35 y/o male with PMH of 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   patient had a right humeral fracture and was planned for surgery this Thursday. On pre-surgery eval, his BP was high so he was sent to the ED.   Yesterday, he came to the ED but then left AMA cause he didn't have his phone with him. He came back today. He denied any pain, and he walks every day around 1 to 2 miles with no symptoms. 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 Reports that he is on amlodipine 10 mg OD and metoprolol T 50 mg BID. But he didn't take them for the past 3 days.     In 2018 he was started on Hyzaar, reports he had elevated BP despite the medication, was started on amlodipine - developed edema, then in 2020 was switched by Dr. Chen to Coreg and Furosemide. Then last time seen by Dr Carson in 2020 for HTN and the plan was to continue same management and add valsartan if BP was still high; but patient was lost to follow up. Back then Renal Doppler was negative, and reportedly workup forn secondary HTN with Dr. Chen was negative (we don't have them). ECG in 2020 revealed LVH with secondary changes and PVC's. Patient reports that he was following with his PCP dr Sharp on [facility]. And she told him previously that his creatinine was high but he didn't follow up with a nephrologist. He rarely checks his BP at home; and when he does it is usually 180-190.   patient is not in pain from the humeral fracture    The patient denies chest pain, dyspnea, orthopnea or PND. No syncope or falls. No edema. No previous TTE on the system  Family Hx: Father : HTN starting age 55 y. Siblings: sister and bother younger than him): no BP    In the ED :   Vitals: · BP  224/ 137 mm Hg · Heart Rate	96 /min · Respiration Rate 16 /min · Temp 36.9 C · SpO2 98   Labs: trop 28 / CBC unremarkable  / creat 2.6   given in the ed labetalol 10 and started on nicardipine drip  (27 Aug 2024 17:18)    Hospital Course:  Patient admitted to CCU for HTN emergency (/137 w/ elevated troponin and MARCO). Started on nicardipine drip, c/w labetalol 100mg tid and hydralazine 25 mg TID. TTE w/ LVH and EF nl. Nephro consulted. Workup for secondary HTN done as patient doesn't have previous records. TSH 3.99, Aldosterone 24.4, Renin 16, A/R 0.7, dsDNA neg, C3/C4 neg, hepatitis panel neg, IF K/L neg, AM cortisol 16.3, Urine w/ <1g proteinuria, no hematuria. Metanephrines urine/serum pending. Renal US with mildly echogenic kidneys, needs US renal w/ doppler. Nitro drip weaned and patient DGTF. BP meds adjusted PRN.   Patient also with mild transaminitis (AlkPhos 108, AST 92, ), asymptomatic.   Patient worried about staying in hospital for too long and wanted to follow up outpatient regarding secondary HTN workup. We discussed monitoring BP at home and taking medications as well as following a low salt diet and patient agreed to comply.     Discussion of discharge plan of care, including discharge diagnoses, medication reconciliation, and follow-ups was conducted with Dr. Mei on 9/2/24 at 10AM and discharge was approved.      Important Medication Changes and Reason:  [] clonidine 0.1 mg PO BID for hypertension  [] hydralazine 50mg PO q6h for hypertension  [] HCTZ 25mg PO qd for hypertension  [] labetalol 200mg PO TID for hypertension  [] nifedipine XL 90mg PO qd for hypertension     Active or Pending Issues Requiring Follow-up:  [] repeat LFTs in 1-3 days of discharge  [] US renal w/ doppler to evaluate for renal artery stenosis   [] f/u metanephrines (serum and urine), KRISTA for secondary hypertension workup  [] follow up with your PCP to monitor BP, follow up on secondary hypertension workup, and LFTs (monitor transaminitis)   [] follow up with nephrology regarding kidney function and secondary hypertension workup  [] follow up with your orthopedic surgeon regarding right humeral fracture     Discharge Diagnoses:  [] Hypertensive emergency   [] Transaminitis   [] Right humeral fracture          HPI:  35 y/o male with PMH of 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   patient had a right humeral fracture and was planned for surgery this Thursday. On pre-surgery eval, his BP was high so he was sent to the ED.   Yesterday, he came to the ED but then left AMA cause he didn't have his phone with him. He came back today. He denied any pain, and he walks every day around 1 to 2 miles with no symptoms. 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 Reports that he is on amlodipine 10 mg OD and metoprolol T 50 mg BID. But he didn't take them for the past 3 days.     In 2018 he was started on Hyzaar, reports he had elevated BP despite the medication, was started on amlodipine - developed edema, then in 2020 was switched by Dr. Chen to Coreg and Furosemide. Then last time seen by Dr Carson in 2020 for HTN and the plan was to continue same management and add valsartan if BP was still high; but patient was lost to follow up. Back then Renal Doppler was negative, and reportedly workup forn secondary HTN with Dr. Chen was negative (we don't have them). ECG in 2020 revealed LVH with secondary changes and PVC's. Patient reports that he was following with his PCP dr Sharp on [facility]. And she told him previously that his creatinine was high but he didn't follow up with a nephrologist. He rarely checks his BP at home; and when he does it is usually 180-190.   patient is not in pain from the humeral fracture    The patient denies chest pain, dyspnea, orthopnea or PND. No syncope or falls. No edema. No previous TTE on the system  Family Hx: Father : HTN starting age 55 y. Siblings: sister and bother younger than him): no BP    In the ED :   Vitals: · BP  224/ 137 mm Hg · Heart Rate	96 /min · Respiration Rate 16 /min · Temp 36.9 C · SpO2 98   Labs: trop 28 / CBC unremarkable  / creat 2.6   given in the ed labetalol 10 and started on nicardipine drip  (27 Aug 2024 17:18)    Hospital Course:  Patient admitted to CCU for HTN emergency (/137 w/ elevated troponin and MARCO). Started on nicardipine drip, c/w labetalol 100mg tid and hydralazine 25 mg TID. TTE w/ LVH and EF nl. Nephro consulted. Nitro drip weaned and patient DGTF.    Workup for secondary HTN done as patient doesn't have previous records. TSH 3.99, Aldosterone 24.4, Renin 16, A/R 0.7, KRISTA neg, dsDNA neg, C3/C4 neg, hepatitis panel neg, IF K/L neg, AM cortisol 16.3, Urine w/ <1g proteinuria, no hematuria. Metanephrines urine/serum normal. Renal US with mildly echogenic kidneys, duplex w/o renal artery stenosis. BP meds adjusted PRN.   Cardiac MRI also completed, findings below.  Patient also with mild transaminitis, asymptomatic, follow up outpatient (likely medication induced)  We discussed monitoring BP at home and taking medications as well as following a low salt diet and patient agreed to comply.     (CARDIAC MRI) SUMMARY:  1. Normal left ventricular size. Moderate-severe asymmetric hypertrophy   (maximal wall thickness 2.4 cm as measured in the mid-anteroseptum).   Normal left ventricular systolic function (LVEF 65-70%, visually   estimated). Near complete systolic cavity obliteration of the distal left   ventricle.  2. Diffuse patchy areas of myocardial scar involving the mid-myocardial   aspect of the basal-distal anterior, anterolateral and mid-distal septal,   inferior and inferolateral walls. This is consistent with a non-ischemic   cardiomyopathy and has been reported in the context of hypertrophic   cardiomyopathy.  3. Normal right ventricular size. Normal right ventricular systolic   function.  4. Mildly dilated left atrium. Mild systolic anterior motion of the   mitral valve. Mild mitral regurgitation.    Left ventricular morphology and tissue characteristics are suggestive of   hypertrophic cardiomyopathy.    Discussion of discharge plan of care, including discharge diagnoses, medication reconciliation, and follow-ups was conducted with Dr. Vasques on 9/4/24 at 10AM and discharge was approved.      Important Medication Changes and Reason:  [] clonidine 0.2mg PO TID for hypertension  [] hydralazine 100mg PO TID for hypertension  [] labetalol 200mg PO TID for hypertension  [] nifedipine XL 90mg PO qd for hypertension     Active or Pending Issues Requiring Follow-up:  [] repeat LFTs in 1-3 days of discharge  [] follow up with your PCP to monitor BP and manage medications   [] follow up with nephrology regarding kidney function  [] follow up with cardiology regarding hypertrophic cardiomyopathy  [] follow up with your orthopedic surgeon regarding right humeral fracture     Discharge Diagnoses:  [] Hypertensive emergency   [] Hypertrophic cardiomyopathy  [] Elevated creatinine  [] Transaminitis   [] Right humeral fracture          HPI:  33 y/o male with PMH of 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   patient had a right humeral fracture and was planned for surgery this Thursday. On pre-surgery eval, his BP was high so he was sent to the ED.   Yesterday, he came to the ED but then left AMA cause he didn't have his phone with him. He came back today. He denied any pain, and he walks every day around 1 to 2 miles with no symptoms. 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 Reports that he is on amlodipine 10 mg OD and metoprolol T 50 mg BID. But he didn't take them for the past 3 days.     In 2018 he was started on Hyzaar, reports he had elevated BP despite the medication, was started on amlodipine - developed edema, then in 2020 was switched by Dr. Chen to Coreg and Furosemide. Then last time seen by Dr Carson in 2020 for HTN and the plan was to continue same management and add valsartan if BP was still high; but patient was lost to follow up. Back then Renal Doppler was negative, and reportedly workup forn secondary HTN with Dr. Chen was negative (we don't have them). ECG in 2020 revealed LVH with secondary changes and PVC's. Patient reports that he was following with his PCP dr Sharp on [facility]. And she told him previously that his creatinine was high but he didn't follow up with a nephrologist. He rarely checks his BP at home; and when he does it is usually 180-190.   patient is not in pain from the humeral fracture    The patient denies chest pain, dyspnea, orthopnea or PND. No syncope or falls. No edema. No previous TTE on the system  Family Hx: Father : HTN starting age 55 y. Siblings: sister and bother younger than him): no BP    In the ED :   Vitals: · BP  224/ 137 mm Hg · Heart Rate	96 /min · Respiration Rate 16 /min · Temp 36.9 C · SpO2 98   Labs: trop 28 / CBC unremarkable  / creat 2.6   given in the ed labetalol 10 and started on nicardipine drip  (27 Aug 2024 17:18)    Hospital Course:  Patient admitted to CCU for HTN emergency (/137 w/ elevated troponin and MARCO). Started on nicardipine drip, c/w labetalol 100mg tid and hydralazine 25 mg TID. TTE w/ LVH and EF nl. Nephro consulted. Nitro drip weaned and patient DGTF.    Workup for secondary HTN done as patient doesn't have previous records. TSH 3.99, Aldosterone 24.4, Renin 16, A/R 0.7, KRISTA neg, dsDNA neg, C3/C4 neg, hepatitis panel neg, IF K/L neg, AM cortisol 16.3, Urine w/ <1g proteinuria, no hematuria. Metanephrines urine/serum normal. Renal US with mildly echogenic kidneys, duplex w/o renal artery stenosis. BP meds adjusted PRN.   Cardiac MRI also completed, findings below.  Patient also with mild transaminitis, asymptomatic, follow up outpatient (likely medication induced)  We discussed monitoring BP at home and taking medications as well as following a low salt diet and patient agreed to comply.     (CARDIAC MRI) SUMMARY:  1. Normal left ventricular size. Moderate-severe asymmetric hypertrophy   (maximal wall thickness 2.4 cm as measured in the mid-anteroseptum).   Normal left ventricular systolic function (LVEF 65-70%, visually   estimated). Near complete systolic cavity obliteration of the distal left   ventricle.  2. Diffuse patchy areas of myocardial scar involving the mid-myocardial   aspect of the basal-distal anterior, anterolateral and mid-distal septal,   inferior and inferolateral walls. This is consistent with a non-ischemic   cardiomyopathy and has been reported in the context of hypertrophic   cardiomyopathy.  3. Normal right ventricular size. Normal right ventricular systolic   function.  4. Mildly dilated left atrium. Mild systolic anterior motion of the   mitral valve. Mild mitral regurgitation.    Left ventricular morphology and tissue characteristics are suggestive of   hypertrophic cardiomyopathy.    Discussion of discharge plan of care, including discharge diagnoses, medication reconciliation, and follow-ups was conducted with Dr. Vasques on 9/4/24 at 10AM and discharge was approved.    Alb: 4.3 g/dL / Pro: 6.9 g/dL / ALK PHOS: 120 U/L / ALT: 143 U/L / AST: 73 U/L / GGT: x     /  julia x    / 0.6 mg/dL ( 03 Sep 2024 08:00 )  Alb: 4.3 g/dL / Pro: 6.6 g/dL / ALK PHOS: 108 U/L / ALT: 124 U/L / AST: 92 U/L / GGT: x     /  julia x    / 0.6 mg/dL ( 02 Sep 2024 06:59 )  Alb: 3.9 g/dL / Pro: 6.5 g/dL / ALK PHOS: 111 U/L / ALT: 67 U/L / AST: 61 U/L / GGT: x     /  julia x    / 0.5 mg/dL ( 01 Sep 2024 06:27 )    Active or Pending Issues Requiring Follow-up:  [] repeat LFTs in 1-3 days of discharge  [] follow up with your PCP to monitor BP and manage medications   [] follow up with nephrology regarding kidney function  [] follow up with cardiology regarding hypertrophic cardiomyopathy  [] follow up with your orthopedic surgeon regarding right humeral fracture     Discharge Diagnoses:  [] Hypertensive emergency   [] Hypertrophic cardiomyopathy  [] Elevated creatinine  [] Transaminitis   [] Right humeral fracture

## 2024-09-02 NOTE — DISCHARGE NOTE PROVIDER - CARE PROVIDER_API CALL
Emmy Rodriguez  Internal Medicine  [address] [place] [address]  Phone: [phone]  Fax: [phone]  Follow Up Time: 1-3 days    Siena Vale  Nephrology  [address] [place] [address]  Phone: [phone]  Fax: [phone]  Follow Up Time: 1 week   Emmy Rodriguez  Internal Medicine  [address] [place] [address]  Phone: [phone]  Fax: [phone]  Follow Up Time: 1-3 days    Siena Vael  Nephrology  [address] [place] [address]  Phone: [phone]  Fax: [phone]  Follow Up Time: 1 week    Homer Little  Cardiovascular Disease  [address] [facility] [facility], [address]  [place] [address]  Phone: [phone]  Fax: [phone]  Follow Up Time:    Homer Little  Cardiovascular Disease  [address] [facility], [address]  [place] [address]  Phone: [phone]  Fax: [phone]  Follow Up Time: 1 week    Lila Chen  Internal Medicine  [facility] [place] [address]  Phone: [phone]  Fax: [phone]  Established Patient  Scheduled Appointment: 09/06/2024 02:30 PM    Siena Vale  Nephrology  [address] [place] [address]  Phone: [phone]  Fax: [phone]  Follow Up Time: 1 week

## 2024-09-02 NOTE — DISCHARGE NOTE PROVIDER - NSDCMRMEDTOKEN_GEN_ALL_CORE_FT
cloNIDine 0.1 mg oral tablet: 1 tab(s) orally 2 times a day  hydrALAZINE 50 mg oral tablet: 1 tab(s) orally every 6 hours  hydroCHLOROthiazide 25 mg oral tablet: 1 tab(s) orally once a day  labetalol 200 mg oral tablet: 1 tab(s) orally 3 times a day  NIFEdipine 60 mg oral tablet, extended release: 1 tab(s) orally once a day   cloNIDine 0.2 mg oral tablet: 1 tab(s) orally 3 times a day  hydrALAZINE 100 mg oral tablet: 1 tab(s) orally every 8 hours  labetalol 200 mg oral tablet: 1 tab(s) orally 3 times a day  NIFEdipine 90 mg oral tablet, extended release: 1 tab(s) orally once a day   cloNIDine 0.2 mg oral tablet: 1 tab(s) orally 2 times a day one dose tonight 9/4 and one dose on 9/5 am, and then stop this medication  labetalol 300 mg oral tablet: 1 tab(s) orally 2 times a day  NIFEdipine 60 mg oral tablet, extended release: 1 tab(s) orally 2 times a day  olmesartan 20 mg oral tablet: 1 tab(s) orally once a day

## 2024-09-02 NOTE — DISCHARGE NOTE PROVIDER - NSDCCPCAREPLAN_GEN_ALL_CORE_FT
PRINCIPAL DISCHARGE DIAGNOSIS  Diagnosis: Hypertensive emergency  Assessment and Plan of Treatment: You came to the ED with very elevated blood pressure and signs of organ damage to your heart and kidneys. High blood pressure, or hypertension, has many causes. We did blood tests and imaging to determine the cause of your hypertension, they have so far been negative but some of these tests are still pending results. We treated your blood pressure with IV medication and then transitioned you to oral medication.   It is extremely important for you to follow up with your primary care physician and kidney doctor (nephrologist) regarding your blood pressure and the labs/imaging to understand the cause of your high blood pressure. Please continue taking all of your prescribed medications and please follow up with your primary doctor in 1-3 days of discharge. Please take your BP at home and keep a record, notify your doctor if your BP is higher than 180/120. Please eat a low salt diet (DASH diet) as this will also help lower your blood pressure.   Do you need a primary care doctor or follow-up with a specialist? Our care coordinators will help you find providers near you and schedule any follow-up care visits.  Monday-Friday: 9am-5pm  Call our CareBridge team: [phone]        SECONDARY DISCHARGE DIAGNOSES  Diagnosis: Transaminitis  Assessment and Plan of Treatment: Your liver enzymes were slightly elevated. You did not have any symptoms. We recommend repeating this test with your primary care doctor within 1-3 days of discharge. Please follow up with your PCP and avoid drinking alcohol or taking medications like tylenol as these can have negative effects on your liver.    Diagnosis: Fracture of humerus  Assessment and Plan of Treatment: You came to the ED with a known fracture of your right shoulder. Please continue wearing the sling and follow up with your orthopedic surgeon     PRINCIPAL DISCHARGE DIAGNOSIS  Diagnosis: Hypertensive emergency  Assessment and Plan of Treatment: You came to the ED with very elevated blood pressure and signs of organ damage to your heart and kidneys. High blood pressure, or hypertension, has many causes. We did blood tests and imaging to determine the cause of your hypertension, they were negative. We treated your blood pressure with IV medication and then transitioned you to oral medication.   It is extremely important for you to follow up with your primary care physician (appointment 9/13) and kidney doctor (nephrologist). Please continue taking all of your prescribed medications and please follow up with your primary doctor in 1-3 days of discharge. Please take your BP at home and keep a record, notify your doctor if your BP is higher than 180/120. Please eat a low salt diet (DASH diet) as this will also help lower your blood pressure.   Do you need a primary care doctor or follow-up with a specialist? Our care coordinators will help you find providers near you and schedule any follow-up care visits.  Monday-Friday: 9am-5pm  Call our CareBridge team: [phone]        SECONDARY DISCHARGE DIAGNOSES  Diagnosis: Transaminitis  Assessment and Plan of Treatment: Your liver enzymes were slightly elevated. You did not have any symptoms. We recommend repeating this test with your primary care doctor within 1-3 days of discharge. Please follow up with your PCP and avoid drinking alcohol or taking tylenol as these can have negative effects on your liver.    Diagnosis: Fracture of humerus  Assessment and Plan of Treatment: You came to the ED with a known fracture of your right shoulder. Please continue wearing the sling and follow up with your orthopedic surgeon    Diagnosis: Hypertrophic cardiomyopathy  Assessment and Plan of Treatment: We imaged your heart and you had findings showing thickening of the heart muscle. This has several causes and in some cases can have adverse effects on your health. This condition requires monitoring and further workup. Please follow up with Dr. Little from cardiology (heart specialist) and continue all of your prescribed medications.    Diagnosis: Elevated serum creatinine  Assessment and Plan of Treatment: You came in with elevated kidney enzymes. This can sometimes be due to acute kidney injury or chronic. High blood pressure is likely the cause of your kidney injury. It is very important to take your blood pressure medicines and follow up with a kidney doctor and your PCP For monitoring.     PRINCIPAL DISCHARGE DIAGNOSIS  Diagnosis: Hypertensive emergency  Assessment and Plan of Treatment: You came to the ED with very elevated blood pressure and signs of organ damage to your heart and kidneys. High blood pressure, or hypertension, has many causes. We did blood tests and imaging to determine the cause of your hypertension, they were negative. We treated your blood pressure with IV medication and then transitioned you to oral medication.   Please take your BP meds as prescribed, and also monitor your blood pressure at home and keep a record,  if your BP is higher than 180/120 seek immediate medical care ( urgent care or emergency room )   Please eat a low salt diet and low fat (DASH diet) as this will also help lower your blood pressure.   Avoid strenous physical activity , but light exercise is allowed.   You have an appointmen with your primary care physician on Friday 9/6 at 2:30 pm, please make sure you see your PMD.   you will need blood work to monitor the kidney function, please have blood work with the pmd   The blood work and tests done in the hospital findings suggest that you have hypertrophic cardiac disease, ( thickened heart muscle)  please followup with the cardialogist as soon as possible. The cardiology office will be in touch with you as well.   Also the blood work shows that you have kidney disease, please followup with kidney doctors to monitor your kidney function .         SECONDARY DISCHARGE DIAGNOSES  Diagnosis: Fracture of humerus  Assessment and Plan of Treatment: You came to the ED with a known fracture of your right shoulder. Please continue wearing the sling and follow up with your orthopedic surgeon    Diagnosis: Hypertrophic cardiomyopathy  Assessment and Plan of Treatment: We imaged your heart and you had findings showing thickening of the heart muscle. This has several causes and in some cases can have adverse effects on your health. This condition requires monitoring and further workup. Please follow up with Dr. Little from cardiology (heart specialist) and continue all of your prescribed medications.    Diagnosis: Elevated serum creatinine  Assessment and Plan of Treatment: Your blood work shows abnormal kidney tests. High blood pressure is likely the cause of your kidney injury. It is very important to take your blood pressure medicines and follow up with a kidney doctor and your PCP For monitoring.

## 2024-09-03 LAB
ALBUMIN SERPL ELPH-MCNC: 4.3 G/DL — SIGNIFICANT CHANGE UP (ref 3.5–5.2)
ALP SERPL-CCNC: 120 U/L — HIGH (ref 30–115)
ALT FLD-CCNC: 143 U/L — HIGH (ref 0–41)
ANA TITR SER: NEGATIVE — SIGNIFICANT CHANGE UP
ANION GAP SERPL CALC-SCNC: 13 MMOL/L — SIGNIFICANT CHANGE UP (ref 7–14)
AST SERPL-CCNC: 73 U/L — HIGH (ref 0–41)
BILIRUB SERPL-MCNC: 0.6 MG/DL — SIGNIFICANT CHANGE UP (ref 0.2–1.2)
BUN SERPL-MCNC: 43 MG/DL — HIGH (ref 10–20)
CALCIUM SERPL-MCNC: 9.9 MG/DL — SIGNIFICANT CHANGE UP (ref 8.4–10.5)
CHLORIDE SERPL-SCNC: 97 MMOL/L — LOW (ref 98–110)
CO2 SERPL-SCNC: 27 MMOL/L — SIGNIFICANT CHANGE UP (ref 17–32)
CREAT SERPL-MCNC: 2.4 MG/DL — HIGH (ref 0.7–1.5)
CREATININE, RNDM UR: 42.8 MG/DL — SIGNIFICANT CHANGE UP
EGFR: 35 ML/MIN/1.73M2 — LOW
GLUCOSE SERPL-MCNC: 82 MG/DL — SIGNIFICANT CHANGE UP (ref 70–99)
MAGNESIUM SERPL-MCNC: 2.2 MG/DL — SIGNIFICANT CHANGE UP (ref 1.8–2.4)
METANEPHS 24H UR-MCNC: 0.3 — SIGNIFICANT CHANGE UP (ref 0–1)
METANEPHS 24H UR-MCNC: 32 UG/L — SIGNIFICANT CHANGE UP
NORMETANEPHRINE.: 90 UG/L — SIGNIFICANT CHANGE UP
POTASSIUM SERPL-MCNC: 4.4 MMOL/L — SIGNIFICANT CHANGE UP (ref 3.5–5)
POTASSIUM SERPL-SCNC: 4.4 MMOL/L — SIGNIFICANT CHANGE UP (ref 3.5–5)
PROT SERPL-MCNC: 6.9 G/DL — SIGNIFICANT CHANGE UP (ref 6–8)
SODIUM SERPL-SCNC: 137 MMOL/L — SIGNIFICANT CHANGE UP (ref 135–146)

## 2024-09-03 PROCEDURE — 99232 SBSQ HOSP IP/OBS MODERATE 35: CPT

## 2024-09-03 PROCEDURE — 93975 VASCULAR STUDY: CPT | Mod: 26

## 2024-09-03 PROCEDURE — 75561 CARDIAC MRI FOR MORPH W/DYE: CPT | Mod: 26

## 2024-09-03 RX ORDER — CLONIDINE HCL 0.2 MG
0.2 TABLET ORAL THREE TIMES A DAY
Refills: 0 | Status: DISCONTINUED | OUTPATIENT
Start: 2024-09-03 | End: 2024-09-04

## 2024-09-03 RX ORDER — CLONIDINE HCL 0.2 MG
0.2 TABLET ORAL THREE TIMES A DAY
Refills: 0 | Status: DISCONTINUED | OUTPATIENT
Start: 2024-09-03 | End: 2024-09-03

## 2024-09-03 RX ORDER — CLONIDINE HCL 0.2 MG
0.1 TABLET ORAL THREE TIMES A DAY
Refills: 0 | Status: DISCONTINUED | OUTPATIENT
Start: 2024-09-03 | End: 2024-09-03

## 2024-09-03 RX ORDER — HYDRALAZINE HCL 50 MG
75 TABLET ORAL THREE TIMES A DAY
Refills: 0 | Status: DISCONTINUED | OUTPATIENT
Start: 2024-09-03 | End: 2024-09-04

## 2024-09-03 RX ADMIN — CHLORHEXIDINE GLUCONATE 1 APPLICATION(S): 40 SOLUTION TOPICAL at 22:23

## 2024-09-03 RX ADMIN — Medication 5000 UNIT(S): at 22:24

## 2024-09-03 RX ADMIN — Medication 200 MILLIGRAM(S): at 14:39

## 2024-09-03 RX ADMIN — Medication 5000 UNIT(S): at 06:18

## 2024-09-03 RX ADMIN — Medication 75 MILLIGRAM(S): at 22:21

## 2024-09-03 RX ADMIN — Medication 200 MILLIGRAM(S): at 06:17

## 2024-09-03 RX ADMIN — Medication 5000 UNIT(S): at 14:40

## 2024-09-03 RX ADMIN — Medication 200 MILLIGRAM(S): at 22:21

## 2024-09-03 RX ADMIN — Medication 0.2 MILLIGRAM(S): at 22:21

## 2024-09-03 RX ADMIN — Medication 0.2 MILLIGRAM(S): at 16:08

## 2024-09-03 RX ADMIN — Medication 50 MILLIGRAM(S): at 06:56

## 2024-09-03 RX ADMIN — Medication 50 MILLIGRAM(S): at 06:17

## 2024-09-03 RX ADMIN — NIFEDIPINE 90 MILLIGRAM(S): 60 TABLET, FILM COATED, EXTENDED RELEASE ORAL at 06:17

## 2024-09-03 RX ADMIN — Medication 0.1 MILLIGRAM(S): at 06:17

## 2024-09-03 NOTE — PROGRESS NOTE ADULT - ASSESSMENT
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 patient had a right humeral fracture and was planned for surgery this Thursday.  On pre-surgery eval, his BP was high so he was sent to the ED.     He denied any pain, and he walks every day around 1 to 2 miles with no symptoms. 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 But he didn't take them for the past 3 days. In 2018 he was started on Hyzaar, reports he had elevated BP despite the medication, was started on amlodipine - developed edema, then in 2020 was switched by Dr. Chen to Coreg and Furosemide. Then last time seen by Dr Carson in 2020 for HTN and the plan was to continue same management and add valsartan if BP was still high; but patient was lost to follow up. Back then Renal Doppler was negative, and reportedly workup forn secondary HTN with Dr. Chen was negative (we don't have them). ECG in 2020 revealed LVH with secondary changes and PVC's.    Patient reports that he was following with his PCP dr Sharp on [facility]. And she told him previously that his creatinine was high but he didn't follow up with a nephrologist. he rarely checks his BP at home; and when he does it is usually 180-190. patient is not in pain from the humeral fracture. The patient denies chest pain, dyspnea, orthopnea or PND.   No syncope or falls. No edema. No previous TTE on the system    # hypertensive emergency (MARCO + elevated trop) in the setting of medication non compliance  # Non oliguric MARCO with proteinuria likely on hypertensive CKD   # Hypertensive heart disease (LVH)  - Daily Weight.   - Strict I&Os. Keep I < O  - s/p nicardipine gtt   - hold ACE-i or ARBs given the MARCO for now   - Cr 2.3-2.7 likely baseline fxn  - nephro following  - secondary HTN w/u: jorden 24.4, renin 16, A/R 0.7, AM cortisol 16, TSH 3.99, dsDNA/C3/C4 neg, hepatitis neg, k/l ratio neg, KRISTA neg, Urine normetanephrine 90/metanephrine 32/met:Cr 0.3  - US renal: echogenic kidneys  - f/u serum metanephrine  - f/u US renal duplex   - c/w labetalol 200mg TID, hydralazine 50mg q6h, HCTZ 25mg qd, procardia 90mg qd   - increase clonidine 0.2mg TID      #Transaminitis   - uptrending but asymptomatic,  - possibly medication induced   - will continue to trend    # MARCO on CKD? - MARCO resolved   - Creatinine : 2.5 >> 2.3   - K+ and bicarb within normal limits   - Nephrology following  - Avoid nephrotoxic agents  - Monitor BUN/creatinine  - workup as above   - no indication for HD now     ---------------------  DVT ppx: ambulation  Diet: DASH  GI ppx/Bowel regimen: not indicated  Activity: BERNICE  GOC: full code  Dispo: pending 2ndary HTN workup and BP control  #Summary/Handoff:  - f/u US renal doppler, metanephrines, monitor BP

## 2024-09-03 NOTE — PROGRESS NOTE ADULT - SUBJECTIVE AND OBJECTIVE BOX
ALENA ABRAMS  34y  Male      Patient is a 34y old  Male who presents with a chief complaint of HTN emergency,     INTERVAL HPI/OVERNIGHT EVENTS:      ******************************* REVIEW OF SYSTEMS:**********************************************    All other review of systems negative    *********************** VITALS ******************************************    T(F): 97.6 (09-03-24 @ 05:00)  HR: 79 (09-03-24 @ 05:00) (73 - 82)  BP: 147/82 (09-03-24 @ 05:00) (147/82 - 179/110)  RR: 18 (09-03-24 @ 05:00) (18 - 18)  SpO2: 100% (09-03-24 @ 05:00) (99% - 100%)            ******************************** PHYSICAL EXAM:**************************************************  GENERAL: NAD    PSYCH: no agitation, baseline mentation  HEENT:     NERVOUS SYSTEM:  Alert & Oriented X3,     PULMONARY: OLIVIA, CTA    CARDIOVASCULAR: S1S2 RRR    GI: Soft, NT, ND; BS present.    EXTREMITIES:  2+ Peripheral Pulses, No clubbing, cyanosis, or edema    LYMPH: No lymphadenopathy noted    SKIN: No rashes or lesions      **************************** LABS *******************************************************                          14.3   6.67  )-----------( 171      ( 02 Sep 2024 06:59 )             44.0     09-03    137  |  97<L>  |  43<H>  ----------------------------<  82  4.4   |  27  |  2.4<H>    Ca    9.9      03 Sep 2024 08:00  Mg     2.2     09-03    TPro  6.9  /  Alb  4.3  /  TBili  0.6  /  DBili  x   /  AST  73<H>  /  ALT  143<H>  /  AlkPhos  120<H>  09-03      Urinalysis Basic - ( 03 Sep 2024 08:00 )    Color: x / Appearance: x / SG: x / pH: x  Gluc: 82 mg/dL / Ketone: x  / Bili: x / Urobili: x   Blood: x / Protein: x / Nitrite: x   Leuk Esterase: x / RBC: x / WBC x   Sq Epi: x / Non Sq Epi: x / Bacteria: x        Lactate Trend        CAPILLARY BLOOD GLUCOSE              **************************Active Medications *******************************************  No Known Allergies      chlorhexidine 2% Cloths 1 Application(s) Topical daily  cloNIDine 0.1 milliGRAM(s) Oral two times a day  heparin   Injectable 5000 Unit(s) SubCutaneous every 8 hours  hydrALAZINE 50 milliGRAM(s) Oral every 6 hours  hydrochlorothiazide 25 milliGRAM(s) Oral daily  labetalol 200 milliGRAM(s) Oral three times a day  NIFEdipine XL 90 milliGRAM(s) Oral daily      ***************************************************  RADIOLOGY & ADDITIONAL TESTS:    Imaging Personally Reviewed:  [ ] YES  [ ] NO    HEALTH ISSUES - PROBLEM Dx:

## 2024-09-03 NOTE — PROGRESS NOTE ADULT - ASSESSMENT
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   patient had a right humeral fracture and was planned for surgery this Thursday.   On pre-surgery eval, his BP was high so he was sent to the ED.     He denied any pain, and he walks every day around 1 to 2 miles with no symptoms.   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  Reports that he is on amlodipine 10 mg OD and metoprolol T 50 mg BID. But he didn't take them for the past 3 days.     In 2018 he was started on Hyzaar, reports he had elevated BP despite the medication, was started on amlodipine - developed edema, then in 2020 was switched by Dr. Chen to Coreg and Furosemide. Then last time seen by Dr Carson in 2020 for HTN and the plan was to continue same management and add valsartan if BP was still high; but patient was lost to follow up.   Back then Renal Doppler was negative, and reportedly workup forn secondary HTN with Dr. Chen was negative (we don't have them). ECG in 2020 revealed LVH with secondary changes and PVC's.    Patient reports that he was following with his PCP dr Sharp on [facility]. And she told him previously that his creatinine was high but he didn't follow up with a nephrologist.   he rarely checks his BP at home; and when he does it is usually 180-190.   patient is not in pain from the humeral fracture    The patient denies chest pain, dyspnea, orthopnea or PND.   No syncope or falls. No edema.     No previous TTE on the system    # hypertensive emergency (MARCO + elevated trop) in the setting of medication non compliance  # Non oliguric MARCO with proteinuria likely on hypertensive CKD   # Hypertensive heart disease (LVH)  - nicardipine drip > titrated off.   - labetalol and hydralazine standing   - consider increasing Nifedipine.   - will not give ACE-i or ARBs given the MARCO?   - regarding the workup of secondary HTN; patient doesn't have the previous records ->   f /up  metanephrine urine and serum,  duplex renal arteries,   MRI chest ( done, pending read)  c-ANCA, p-ANCA >> neg      MARCO on CKD?   >> trend creatinine.   - Creatinine : 2.5 >> 2.3   - K+ and bicarb within normal limits   - Nephrology consult  - Avoid nephrotoxic agents   - no indication for HD now     No need for dvt ppx as ambulatory.   Requested for  [facility] clinic aptmt.     Pending: / Renal doppler  Dispo: Home  Plan d/w the patient at bedside.

## 2024-09-03 NOTE — PROGRESS NOTE ADULT - SUBJECTIVE AND OBJECTIVE BOX
Patient is a 34y old Male who presents with a chief complaint of HTN emergency (02 Sep 2024 10:40)    Today is hospital day 7    Overnight events/Interval History:  - No acute overnight events  - At bedside, patient has been sleeping and eating well. Breathing comfortably on room air. Denies fevers, chills, SOB, chest pain, N/V/D/C, abdominal pain.    ROS:  - All ROS is negative unless indicated in HPI    Code Status:    ALLERGIES:  No Known Allergies    MEDICATIONS:  STANDING MEDICATIONS  chlorhexidine 2% Cloths 1 Application(s) Topical daily  cloNIDine 0.2 milliGRAM(s) Oral three times a day  heparin   Injectable 5000 Unit(s) SubCutaneous every 8 hours  hydrALAZINE 75 milliGRAM(s) Oral three times a day  hydrochlorothiazide 25 milliGRAM(s) Oral daily  labetalol 200 milliGRAM(s) Oral three times a day  NIFEdipine XL 90 milliGRAM(s) Oral daily    PRN MEDICATIONS    VITALS LAST 24H:  Vital Signs Last 24 Hrs  T(C): 36.6 (03 Sep 2024 14:19), Max: 36.6 (02 Sep 2024 20:56)  T(F): 97.8 (03 Sep 2024 14:19), Max: 97.9 (02 Sep 2024 20:56)  HR: 70 (03 Sep 2024 14:35) (70 - 79)  BP: 173/127 (03 Sep 2024 14:35) (147/82 - 179/110)  BP(mean): 103 (03 Sep 2024 05:00) (103 - 103)  RR: 20 (03 Sep 2024 14:19) (18 - 20)  SpO2: 100% (03 Sep 2024 14:19) (99% - 100%)    Parameters below as of 02 Sep 2024 20:56  Patient On (Oxygen Delivery Method): room air            PHYSICAL EXAM:  GENERAL: NAD, lying in bed comfortably  HEENT:  EOMI, Moist mucous membranes  CHEST/LUNG: Clear to auscultation bilaterally; normal respiratory effort, no coughing, wheezes, crackles, or rales.  HEART: Regular rate and rhythm  ABDOMEN: Soft, nontender, nondistended, Bowel sounds present  EXTREMITIES:  2+ Peripheral Pulses, brisk capillary refill. No lower extremity edema, clubbing, cyanosis bilaterally  NERVOUS SYSTEM:  A&Ox3, no focal deficits   SKIN: No rashes or lesions    LABS:                        14.3   6.67  )-----------( 171      ( 02 Sep 2024 06:59 )             44.0     09-03    137  |  97<L>  |  43<H>  ----------------------------<  82  4.4   |  27  |  2.4<H>    Ca    9.9      03 Sep 2024 08:00  Mg     2.2     09-03    TPro  6.9  /  Alb  4.3  /  TBili  0.6  /  DBili  x   /  AST  73<H>  /  ALT  143<H>  /  AlkPhos  120<H>  09-03      Urinalysis Basic - ( 03 Sep 2024 08:00 )    Color: x / Appearance: x / SG: x / pH: x  Gluc: 82 mg/dL / Ketone: x  / Bili: x / Urobili: x   Blood: x / Protein: x / Nitrite: x   Leuk Esterase: x / RBC: x / WBC x   Sq Epi: x / Non Sq Epi: x / Bacteria: x                RADIOLOGY:  CXR      CT                  RADIOLOGY:  CXR      CT

## 2024-09-03 NOTE — PROGRESS NOTE ADULT - SUBJECTIVE AND OBJECTIVE BOX
seen and examined  24 h events noted   no distress         PAST HISTORY  --------------------------------------------------------------------------------  No significant changes to PMH, PSH, FHx, SHx, unless otherwise noted    ALLERGIES & MEDICATIONS  --------------------------------------------------------------------------------  Allergies    No Known Allergies    Intolerances      Standing Inpatient Medications  chlorhexidine 2% Cloths 1 Application(s) Topical daily  cloNIDine 0.1 milliGRAM(s) Oral two times a day  heparin   Injectable 5000 Unit(s) SubCutaneous every 8 hours  hydrALAZINE 50 milliGRAM(s) Oral every 6 hours  hydrochlorothiazide 25 milliGRAM(s) Oral daily  labetalol 200 milliGRAM(s) Oral three times a day  NIFEdipine XL 90 milliGRAM(s) Oral daily      VITALS/PHYSICAL EXAM  --------------------------------------------------------------------------------  T(C): 36.4 (09-03-24 @ 05:00), Max: 36.9 (09-02-24 @ 11:20)  HR: 79 (09-03-24 @ 05:00) (73 - 82)  BP: 147/82 (09-03-24 @ 05:00) (147/82 - 179/110)  RR: 18 (09-03-24 @ 05:00) (18 - 18)  SpO2: 100% (09-03-24 @ 05:00) (98% - 100%)  Wt(kg): --        Physical Exam:  	Gen: NAD  	Pulm: CTA B/L  	CV:  S1S2; no rub  	Abd:  soft, nontender/nondistended  	LE:  no edema      LABS/STUDIES  --------------------------------------------------------------------------------              14.3   6.67  >-----------<  171      [09-02-24 @ 06:59]              44.0     139  |  99  |  36  ----------------------------<  91      [09-02-24 @ 06:59]  4.0   |  25  |  2.3        Ca     9.7     [09-02-24 @ 06:59]      Mg     2.1     [09-02-24 @ 06:59]    TPro  6.6  /  Alb  4.3  /  TBili  0.6  /  DBili  x   /  AST  92  /  ALT  124  /  AlkPhos  108  [09-02-24 @ 06:59]          Creatinine Trend:  SCr 2.3 [09-02 @ 06:59]  SCr 2.3 [09-01 @ 06:27]  SCr 2.5 [08-31 @ 04:57]  SCr 2.3 [08-30 @ 05:22]  SCr 2.5 [08-29 @ 04:45]    Urinalysis - [09-02-24 @ 06:59]      Color  / Appearance  / SG  / pH       Gluc 91 / Ketone   / Bili  / Urobili        Blood  / Protein  / Leuk Est  / Nitrite       RBC  / WBC  / Hyaline  / Gran  / Sq Epi  / Non Sq Epi  / Bacteria     Urine Creatinine 65      [08-29-24 @ 09:37]  Urine Protein 23      [08-29-24 @ 09:37]  Urine Sodium 69.0      [08-27-24 @ 20:13]  Urine Urea Nitrogen 263      [08-27-24 @ 20:13]  Urine Potassium 9      [08-27-24 @ 20:13]  Urine Osmolality 268      [08-27-24 @ 20:13]    TSH 3.99      [08-28-24 @ 04:55]  Lipid: chol 179, , HDL 46, LDL --      [08-29-24 @ 04:45]    HBsAb Nonreact      [08-30-24 @ 05:22]  HBsAg Nonreact      [08-30-24 @ 05:22]  HBcAb Nonreact      [08-30-24 @ 05:22]  HCV 0.09, Nonreact      [08-30-24 @ 05:22]    dsDNA <1      [08-30-24 @ 05:22]  C3 Complement 112      [08-30-24 @ 05:22]  C4 Complement 22      [08-30-24 @ 05:22]  ANCA: cANCA Negative, pANCA Negative, atypical ANCA Negative      [08-30-24 @ 05:22]  Free Light Chains: kappa 4.87, lambda 2.25, ratio = 2.16      [08-30 @ 05:22]  Immunofixation Serum:   No Monoclonal Band Identified      Reference Range: None Detected      [08-30-24 @ 05:22]

## 2024-09-03 NOTE — PROGRESS NOTE ADULT - ASSESSMENT
33 y/o male with PMH of early onset HTN (since the age of 26), LVH, admitted for elevated BP.  patient had a right humeral fracture and was planned for surgery on 8/29. On pre-surgery eval, his BP was high so he was sent to the ED. Reports that he is on amlodipine 10 mg OD and metoprolol T 50 mg BID but ran out of amlodipine a few days prior to presentation. currently admitted to MICU for hypertensive emergency.   cr 2.4 stable since admission   need to repeat w/up secondary HTN / metanephrines / cortisol   sono / echogenic kidneys/ left sono smaller check doppler of renal arteries   jorden level not elevated   BP better controlled   < 1gram proteinuria no hematuria,  outpt Cr would be helpful / pt likely at baseline renal function    last ph at goal    DNA (neg)/ C3/ C4 normal / ANCA(neg)/ hepatitis profile / IF/ K/L (neg)   will follow

## 2024-09-04 VITALS — SYSTOLIC BLOOD PRESSURE: 124 MMHG | HEART RATE: 76 BPM | DIASTOLIC BLOOD PRESSURE: 71 MMHG

## 2024-09-04 LAB
METANEPHRINE, PL: <25 PG/ML — SIGNIFICANT CHANGE UP (ref 0–88)
METANEPHRINE, PL: <25 PG/ML — SIGNIFICANT CHANGE UP (ref 0–88)
NORMETANEPHRINE, PL: 195.2 PG/ML — SIGNIFICANT CHANGE UP (ref 0–210.1)
NORMETANEPHRINE, PL: 227.1 PG/ML — HIGH (ref 0–210.1)
RENIN PLAS-CCNC: 1.45 NG/ML/HR — SIGNIFICANT CHANGE UP (ref 0.17–5.38)

## 2024-09-04 PROCEDURE — 99239 HOSP IP/OBS DSCHRG MGMT >30: CPT

## 2024-09-04 RX ORDER — CLONIDINE HCL 0.2 MG
1 TABLET ORAL
Qty: 90 | Refills: 0
Start: 2024-09-04 | End: 2024-10-03

## 2024-09-04 RX ORDER — HYDRALAZINE HCL 50 MG
1 TABLET ORAL
Qty: 90 | Refills: 0
Start: 2024-09-04 | End: 2024-10-03

## 2024-09-04 RX ORDER — NIFEDIPINE 60 MG/1
1 TABLET, FILM COATED, EXTENDED RELEASE ORAL
Qty: 30 | Refills: 0
Start: 2024-09-04 | End: 2024-10-03

## 2024-09-04 RX ORDER — LISINOPRIL 10 MG/1
20 TABLET ORAL DAILY
Refills: 0 | Status: DISCONTINUED | OUTPATIENT
Start: 2024-09-04 | End: 2024-09-04

## 2024-09-04 RX ORDER — OLMESARTAN MEDOXOMIL 40 MG/1
1 TABLET ORAL
Qty: 30 | Refills: 0
Start: 2024-09-04 | End: 2024-10-03

## 2024-09-04 RX ORDER — CLONIDINE HCL 0.2 MG
1 TABLET ORAL
Qty: 2 | Refills: 0
Start: 2024-09-04 | End: 2024-09-04

## 2024-09-04 RX ORDER — NIFEDIPINE 60 MG/1
1 TABLET, FILM COATED, EXTENDED RELEASE ORAL
Qty: 60 | Refills: 0
Start: 2024-09-04 | End: 2024-10-03

## 2024-09-04 RX ORDER — HYDRALAZINE HCL 50 MG
100 TABLET ORAL EVERY 8 HOURS
Refills: 0 | Status: DISCONTINUED | OUTPATIENT
Start: 2024-09-04 | End: 2024-09-04

## 2024-09-04 RX ADMIN — Medication 0.2 MILLIGRAM(S): at 13:14

## 2024-09-04 RX ADMIN — Medication 5000 UNIT(S): at 05:29

## 2024-09-04 RX ADMIN — LISINOPRIL 20 MILLIGRAM(S): 10 TABLET ORAL at 08:55

## 2024-09-04 RX ADMIN — Medication 200 MILLIGRAM(S): at 13:14

## 2024-09-04 RX ADMIN — Medication 0.2 MILLIGRAM(S): at 05:31

## 2024-09-04 RX ADMIN — Medication 75 MILLIGRAM(S): at 05:29

## 2024-09-04 RX ADMIN — Medication 200 MILLIGRAM(S): at 05:30

## 2024-09-04 RX ADMIN — Medication 5000 UNIT(S): at 13:13

## 2024-09-04 RX ADMIN — Medication 100 MILLIGRAM(S): at 13:14

## 2024-09-04 RX ADMIN — NIFEDIPINE 90 MILLIGRAM(S): 60 TABLET, FILM COATED, EXTENDED RELEASE ORAL at 05:30

## 2024-09-04 NOTE — DISCHARGE NOTE NURSING/CASE MANAGEMENT/SOCIAL WORK - NSDCPEFALRISK_GEN_ALL_CORE
For information on Fall & Injury Prevention, visit: [email or web address] OR  [email or web address] OR  https://www.cdc.gov/steadi/patient.html

## 2024-09-04 NOTE — PROGRESS NOTE ADULT - PROVIDER SPECIALTY LIST ADULT
CCU
Hospitalist
Internal Medicine
Nephrology
CCU
CCU
Critical Care
Hospitalist
Internal Medicine
Internal Medicine
Nephrology
Nephrology
Pulmonology
CCU
Nephrology
Nephrology
Pulmonology

## 2024-09-04 NOTE — PROGRESS NOTE ADULT - SUBJECTIVE AND OBJECTIVE BOX
Nephrology progress note    THIS IS AN INCOMPLETE NOTE . FULL NOTE TO FOLLOW SHORTLY    Patient is seen and examined, events over the last 24 h noted .    Allergies:  No Known Allergies    Hospital Medications:   MEDICATIONS  (STANDING):  chlorhexidine 2% Cloths 1 Application(s) Topical daily  cloNIDine 0.2 milliGRAM(s) Oral three times a day  heparin   Injectable 5000 Unit(s) SubCutaneous every 8 hours  hydrALAZINE 100 milliGRAM(s) Oral every 8 hours  labetalol 200 milliGRAM(s) Oral three times a day  lisinopril 20 milliGRAM(s) Oral daily  NIFEdipine XL 90 milliGRAM(s) Oral daily        VITALS:  T(F): 97.6 (09-04-24 @ 05:00), Max: 97.8 (09-03-24 @ 14:19)  HR: 66 (09-04-24 @ 05:00)  BP: 151/97 (09-04-24 @ 05:00)  RR: 18 (09-04-24 @ 05:00)  SpO2: 100% (09-04-24 @ 05:00)  Wt(kg): --        PHYSICAL EXAM:  Constitutional: NAD  HEENT: anicteric sclera, oropharynx clear, MMM  Neck: No JVD  Respiratory: CTAB, no wheezes, rales or rhonchi  Cardiovascular: S1, S2, RRR  Gastrointestinal: BS+, soft, NT/ND  Extremities: No cyanosis or clubbing. No peripheral edema  :  No espinosa.   Skin: No rashes    LABS:  09-03    137  |  97<L>  |  43<H>  ----------------------------<  82  4.4   |  27  |  2.4<H>    Ca    9.9      03 Sep 2024 08:00  Mg     2.2     09-03    TPro  6.9  /  Alb  4.3  /  TBili  0.6  /  DBili      /  AST  73<H>  /  ALT  143<H>  /  AlkPhos  120<H>  09-03        Urine Studies:  Urinalysis Basic - ( 03 Sep 2024 08:00 )    Color:  / Appearance:  / SG:  / pH:   Gluc: 82 mg/dL / Ketone:   / Bili:  / Urobili:    Blood:  / Protein:  / Nitrite:    Leuk Esterase:  / RBC:  / WBC    Sq Epi:  / Non Sq Epi:  / Bacteria:       Creatinine, Random Urine: 65 mg/dL (08-29 @ 09:37)  Protein/Creatinine Ratio Calculation: 0.4 Ratio (08-29 @ 09:37)      TSH 3.99      [08-28-24 @ 04:55]  Lipid: chol 179, , HDL 46, LDL --      [08-29-24 @ 04:45]    HBsAb Nonreact      [08-30-24 @ 05:22]  HBsAg Nonreact      [08-30-24 @ 05:22]  HBcAb Nonreact      [08-30-24 @ 05:22]  HCV 0.09, Nonreact      [08-30-24 @ 05:22]    KRISTA: titer Negative, pattern --      [08-30-24 @ 05:22]  dsDNA <1      [08-30-24 @ 05:22]  C3 Complement 112      [08-30-24 @ 05:22]  C4 Complement 22      [08-30-24 @ 05:22]  ANCA: cANCA Negative, pANCA Negative, atypical ANCA Negative      [08-30-24 @ 05:22]  Free Light Chains: kappa 4.87, lambda 2.25, ratio = 2.16      [08-30 @ 05:22]  Immunofixation Serum:   No Monoclonal Band Identified      Reference Range: None Detected      [08-30-24 @ 05:22]      RADIOLOGY & ADDITIONAL STUDIES:   Nephrology progress note    Patient is seen and examined, events over the last 24 h noted .  Lying in bed   no events     Allergies:  No Known Allergies    Hospital Medications:   MEDICATIONS  (STANDING):  chlorhexidine 2% Cloths 1 Application(s) Topical daily  cloNIDine 0.2 milliGRAM(s) Oral three times a day  heparin   Injectable 5000 Unit(s) SubCutaneous every 8 hours  hydrALAZINE 100 milliGRAM(s) Oral every 8 hours  labetalol 200 milliGRAM(s) Oral three times a day  lisinopril 20 milliGRAM(s) Oral daily  NIFEdipine XL 90 milliGRAM(s) Oral daily        VITALS:  T(F): 97.6 (09-04-24 @ 05:00), Max: 97.8 (09-03-24 @ 14:19)  HR: 66 (09-04-24 @ 05:00)  BP: 151/97 (09-04-24 @ 05:00)  RR: 18 (09-04-24 @ 05:00)  SpO2: 100% (09-04-24 @ 05:00)          PHYSICAL EXAM:  Constitutional: NAD  Respiratory: CTAB,  Cardiovascular: S1, S2, RRR  Gastrointestinal: BS+, soft, NT/ND  Extremities: No cyanosis or clubbing. No peripheral edema  :  No espinosa.   Skin: No rashes    LABS:  09-03    137  |  97<L>  |  43<H>  ----------------------------<  82  4.4   |  27  |  2.4<H>    Ca    9.9      03 Sep 2024 08:00  Mg     2.2     09-03    TPro  6.9  /  Alb  4.3  /  TBili  0.6  /  DBili      /  AST  73<H>  /  ALT  143<H>  /  AlkPhos  120<H>  09-03        Urine Studies:  Urinalysis Basic - ( 03 Sep 2024 08:00 )    Color:  / Appearance:  / SG:  / pH:   Gluc: 82 mg/dL / Ketone:   / Bili:  / Urobili:    Blood:  / Protein:  / Nitrite:    Leuk Esterase:  / RBC:  / WBC    Sq Epi:  / Non Sq Epi:  / Bacteria:       Creatinine, Random Urine: 65 mg/dL (08-29 @ 09:37)  Protein/Creatinine Ratio Calculation: 0.4 Ratio (08-29 @ 09:37)      TSH 3.99      [08-28-24 @ 04:55]  Lipid: chol 179, , HDL 46, LDL --      [08-29-24 @ 04:45]    HBsAb Nonreact      [08-30-24 @ 05:22]  HBsAg Nonreact      [08-30-24 @ 05:22]  HBcAb Nonreact      [08-30-24 @ 05:22]  HCV 0.09, Nonreact      [08-30-24 @ 05:22]    KRISTA: titer Negative, pattern --      [08-30-24 @ 05:22]  dsDNA <1      [08-30-24 @ 05:22]  C3 Complement 112      [08-30-24 @ 05:22]  C4 Complement 22      [08-30-24 @ 05:22]  ANCA: cANCA Negative, pANCA Negative, atypical ANCA Negative      [08-30-24 @ 05:22]  Free Light Chains: kappa 4.87, lambda 2.25, ratio = 2.16      [08-30 @ 05:22]  Immunofixation Serum:   No Monoclonal Band Identified      Reference Range: None Detected      [08-30-24 @ 05:22]      RADIOLOGY & ADDITIONAL STUDIES:  < from: US Duplex Kidneys (09.03.24 @ 17:48) >  Impression:    No evidence of significant hemodynamic stenosis noted in bilateral renal   arteries.    < end of copied text >

## 2024-09-04 NOTE — DISCHARGE NOTE NURSING/CASE MANAGEMENT/SOCIAL WORK - NSDCFUADDAPPT_GEN_ALL_CORE_FT
[] Follow up with PCP   [] follow up with nephrology regarding kidney function  [] follow up with cardiology regarding hypertrophic cardiomyopathy  [] follow up with your orthopedic surgeon regarding right humeral fracture

## 2024-09-04 NOTE — DISCHARGE NOTE NURSING/CASE MANAGEMENT/SOCIAL WORK - PATIENT PORTAL LINK FT
You can access the FollowMyHealth Patient Portal offered by [facility] by registering at the following website: [email or web address]. By joining [facility]’s FollowMyHealth portal, you will also be able to view your health information using other applications (apps) compatible with our system.

## 2024-09-04 NOTE — PROGRESS NOTE ADULT - ASSESSMENT
33 y/o male with PMH of early onset HTN (since the age of 26), LVH, admitted for elevated BP.  patient had a right humeral fracture and was planned for surgery on 8/29. On pre-surgery eval, his BP was high so he was sent to the ED. Reports that he is on amlodipine 10 mg OD and metoprolol T 50 mg BID but ran out of amlodipine a few days prior to presentation. currently admitted to MICU for hypertensive emergency.  ? HOCM   cr 2.4 stable since admission   need to repeat w/up secondary HTN / metanephrines / cortisol   sono / echogenic kidneys/ left sono smaller- neg  doppler of renal arteries   jorden level not elevated   BP better controlled   < 1gram proteinuria no hematuria,  outpt Cr would be helpful last from one year was 0.9/ pt likely at baseline renal function    last ph at goal    DNA (neg)/ C3/ C4 normal / ANCA(neg)/ hepatitis profile / IF/ K/L (neg)   can switch lisinopril to olmesartan / aim to taper down clonidine hydralazine  on DC can follow up with us -renal in one -2 weeks   will follow - discussed with primary team

## 2024-09-04 NOTE — PROGRESS NOTE ADULT - SUBJECTIVE AND OBJECTIVE BOX
pt seen and examined.     My notes supersede resident's notes in case of discrepancy       ROS: no cp, no sob, no n/v, no fever    Vital Signs Last 24 Hrs  T(C): 36.7 (04 Sep 2024 13:04), Max: 36.7 (04 Sep 2024 13:04)  T(F): 98.1 (04 Sep 2024 13:04), Max: 98.1 (04 Sep 2024 13:04)  HR: 71 (04 Sep 2024 13:04) (57 - 71)  BP: 112/73 (04 Sep 2024 13:04) (112/73 - 176/98)  BP(mean): --  RR: 18 (04 Sep 2024 13:04) (18 - 20)  SpO2: 100% (04 Sep 2024 13:04) (99% - 100%)    physical exam  constitutional NAD, AAOX3, Respiratory  lungs CTA, CVS heart RRR, GI: abdomen Soft NT, ND, BS+, skin: intact  neuro exam no focal deficit     MEDICATIONS  (STANDING):  chlorhexidine 2% Cloths 1 Application(s) Topical daily  cloNIDine 0.2 milliGRAM(s) Oral three times a day  heparin   Injectable 5000 Unit(s) SubCutaneous every 8 hours  hydrALAZINE 100 milliGRAM(s) Oral every 8 hours  labetalol 200 milliGRAM(s) Oral three times a day  NIFEdipine XL 90 milliGRAM(s) Oral daily    09-03    137  |  97<L>  |  43<H>  ----------------------------<  82  4.4   |  27  |  2.4<H>    Ca    9.9      03 Sep 2024 08:00  Mg     2.2     09-03    TPro  6.9  /  Alb  4.3  /  TBili  0.6  /  DBili  x   /  AST  73<H>  /  ALT  143<H>  /  AlkPhos  120<H>  09-03    a/p  HPI:  33 y/o male with PMH of 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   patient had a right humeral fracture and was planned for surgery this Thursday.   On pre-surgery eval, his BP was high so he was sent to the ED.     # hypertensive urgency,   now bp is controlled  possible HCM  dw cardio   dw pmd office   meds adjusted  fu as outpt     # bonifacio   last cr in 2023 was wnl   now pt has stable but elevated cr   dw nephro  needs outpt fu     #Progress Note Handoff    Pending :  discharge   Family discussion: dw pt   Disposition: home   code status: full code  time spent 45 min

## 2024-09-09 LAB — RENIN PLAS-CCNC: 3.23 NG/ML/HR — SIGNIFICANT CHANGE UP (ref 0.17–5.38)

## 2024-09-11 NOTE — CDI QUERY NOTE - NSCDIOTHERTXTBX_GEN_ALL_CORE_HH
Clinical documentation and/or evidence in the medical record indicates that this patient has acute kidney injury.  In order to ensure accurate coding and accuracy of the clinical record, the documentation in this patient’s medical record requires additional clarification.      Please include more specific documentation as to the type of acute kidney injury in your progress note and/or discharge summary.    Please clarify if MARCO documented by the attending provider can be further specified as:      • MARCO has been ruled in  • MARCO has been ruled out  • MARCO could not be ruled out at the time of discharge   • Other (specify)      Supporting documentation and/or clinical evidence:     9/2 Discharge Note Provider: …Diagnosis: Elevated serum creatinine… Your blood work shows abnormal kidney tests. High blood pressure is likely the cause of your kidney injury…    9/4 Hospitalist Progress Note: …# marco. last cr in 2023 was wnl. now pt has stable but elevated cr. dw nephron. needs outpt fu…    9/4 Nephrology Progress Note: …cr 2.4 stable since admission. need to repeat w/up secondary HTN / metanephrines / cortisol. sono / echogenic kidneys/ left sono smaller- neg  doppler of renal arteries… admitted to MICU for hypertensive emergency. ? HOCM… outpt Cr would be helpful last from one year was 0.9/ pt likely at baseline renal function… on DC can follow up with us -renal in one -2 weeks…    Lab Results:  • Creatinine: 2.5 -> 2.4 -> 2.4 -> 2.5 -> 2.3 -> 2.5 -> 2.3 -> 2.3 -> 2.4 (8/27 – 9/3)

## 2024-09-13 NOTE — ED ADULT NURSE NOTE - NS ED NURSE RECORD ANOTHER VITAL SIGN
[Home] : at home, [unfilled] , at the time of the visit. [Medical Office: ([facility])___] : at the medical office located in  [Parents] : parents [Follow-Up: _____] : a [unfilled] follow-up visit  Yes

## 2024-09-17 NOTE — CHART NOTE - NSCHARTNOTEFT_GEN_A_CORE
Spoke with Dr. Allen's office [phone] PCP of José Miguel. Office shared that his baseline BP is 170/90. His last visit was April 3, 2023 however picked up his Metoprolol 50 and Amolodipine 10 on June 2024. His PMhx consists of HTN, cardiomyopathy, polycystic kidney disease, and obesity.
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   patient had a right humeral fracture and was planned for surgery this Thursday.   On pre-surgery eval, his BP was high so he was sent to the ED.   Yesterday, he came to the ED but then left AMA cause he didn't have his phone with him  He came back today.  He denied any pain, and he walks every day around 1 to 2 miles with no symptoms.   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  Reports that he is on amlodipine 10 mg OD and metoprolol T 50 mg BID. But he didn't take them for the past 3 days.     In 2018 he was started on Hyzaar, reports he had elevated BP despite the medication, was started on amlodipine - developed edema, then in 2020 was switched by Dr. Chen to Coreg and Furosemide. Then last time seen by Dr Carson in 2020 for HTN and the plan was to continue same management and add valsartan if BP was still high; but patient was lost to follow up.   Back then Renal Doppler was negative, and reportedly workup forn secondary HTN with Dr. Chen was negative (we don't have them). ECG in 2020 revealed LVH with secondary changes and PVC's.    Patient reports that he was following with his PCP dr Sharp on [facility]. And she told him previously that his creatinine was high but he didn't follow up with a nephrologist.   he rarely checks his BP at home; and when he does it is usually 180-190.   patient is not in pain from the humeral fracture    given in the ed labetalol 10 and started on nicardipine drip         --------------------------------------------------------------------------------------------------------  PMH/PSH:  PAST MEDICAL & SURGICAL HISTORY:  HTN (hypertension)    Humeral fracture    Known health problems: none        -------------------------------------------------------------------------------------------------------  PHYSICAL EXAM:  General: NAD  HEENT: Atraumatic  Respiratory: CTAB  Cardiac: RRR  Abdomen: soft, non-tender, non-distended  Extremities: warm and well-perfused  Neuro: A+Ox4. No FND    Vital Signs Last 24 Hrs  T(C): 35.8 (31 Aug 2024 04:00), Max: 36.7 (30 Aug 2024 20:00)  T(F): 96.5 (31 Aug 2024 04:00), Max: 98 (30 Aug 2024 20:00)  HR: 76 (31 Aug 2024 08:00) (72 - 88)  BP: 125/60 (30 Aug 2024 12:10) (125/60 - 125/60)  BP(mean): 85 (30 Aug 2024 12:10) (85 - 85)  RR: 18 (31 Aug 2024 08:00) (14 - 29)  SpO2: 99% (31 Aug 2024 08:00) (97% - 100%)    Parameters below as of 31 Aug 2024 08:00  Patient On (Oxygen Delivery Method): room air        I&O's Summary    30 Aug 2024 07:01  -  31 Aug 2024 07:00  --------------------------------------------------------  IN: 1107.5 mL / OUT: 2235 mL / NET: -1127.5 mL        --------------------------------------------------------------------------------------------------------  LABS:                               13.0   7.44  )-----------( 141      ( 31 Aug 2024 04:57 )             40.2       08-31    138  |  101  |  29<H>  ----------------------------<  99  3.6   |  24  |  2.5<H>    Ca    8.8      31 Aug 2024 04:57  Mg     2.2     08-31    TPro  6.0  /  Alb  3.9  /  TBili  0.5  /  DBili  x   /  AST  20  /  ALT  21  /  AlkPhos  109  08-31              CULTURE RESULTS:                08-29-24 @ 09:37  Specimen Source: --  Method Type: --  Gram Stain - RRL: --  Gram Stain - Wound: --  Bacteria: Negative  Culture Results: --      Specimen Source:   Method Type:   Gram Stain:   Culture Results:   Bacteria: Bacteria: Negative /HPF (08-29-24 @ 09:37)  Bacteria: Negative /HPF (08-27-24 @ 20:13)  Bacteria: Negative /HPF (08-26-24 @ 19:40)        -------------------------------------------------------------------------------------------------  RADIOLOGY:      ---------------------------------------------------------------------------------------------------  ASSESSMENT & PLAN:     # hypertensive emergency (MARCO + elevated trop) in the setting of medication non compliance  # Non oliguric MARCO with proteinuria likely on hypertensive CKD   # Hypertensive heart disease (LVH)  #MARCO      PLAN:    CNS:   - Avoid CNS Depressants     HEENT:   - Oral care.   - Aspiration precautions     PULMONARY:   - HOB @ 45.   - Monitor Pulse Ox. Keep > 93%. Supplement as needed.    CARDIOVASCULAR:   - Daily Weight.   - Strict I&Os. Keep I < O  - nicardipine drip   - c/w labetalol 100mg tid and hydralazine 25mg tid  - will not give ACE-i or ARBs given the MARCO  - TTE: LVH. EF nl   - repeat ECG in AM   - regarding the workup of secondary HTN; patient doesn't have the previous records -> will repeat it  f up aldosterone (patient is not on ACE-i or ARBs; less likely high jorden as K+ is within normal limits), renin level, metanephrine urine and serum, TSH  Renal US with duplex renal arteries  -Started on clonidine 08/30  - taper nicardipine drips  -increased Procardia 60mg  - goal -160  -echo showed infiltrative cardiomyopathy- pending cardiac MRI  -Marya Patton from cardiac imaging called today 8/30 stating theres a long list of people waiting to seen and if pt doesnt have plans for an ICD he can get the MRI outpt  -spoke with Dr. Allen office in regards to pt. He has been prescribed Amlodipine, 10 mg and Metoprolol 50 mg but pt last picked up script June 2023.     GI:   - GI prophylaxis.   - Diet: DASH/TLC    RENAL:   - Creatinine today: 2.5  - K+ and bicarb within normal limits   - no joints pain/rigidity ; no skin rash; no family history of CKD/dialysis  - Nephrology consult  - Avoid nephrotoxic agents  - Monitor BUN/creatinine  - workup as above   - no indication for HD now   -nephro adjusted hydralazine, and cardio started Procardia  -renal US- showed one cyst    INFECTIOUS DISEASE:   - Monitor WBC  - Trend fever  - no signs or symptoms of infection    HEMATOLOGICAL:   - Monitor H&H  - DVT prophylaxis: heparin SQ    ENDOCRINE:   - Monitor FS  - f up a1c    MUSCULOSKELETAL:   - Ambulate as tolerated   - humeral fracture, needs surgery once optimized cardiac wise      #Med rec: done  #Code status: full         FOR FOLLOW UP:  [f/u Cardiac MRI ]
From renal stand point MARCO could not be ruled out at the time of discharge , though picture c/w chronic CKD component
Patient has not been seen nor evaluated by me. CMR findings of "left ventricular morphology and tissue characteristics are suggestive of hypertrophic cardiomyopathy" were reviewed with the reader. Hypertrophic cardiomyopathy is most consistent with the findings, with infiltrative CM possible but less likely. Findings are not suggestive of sarcoid. Patient should follow-up with Dr. Homer Little for further evaluation of HCM. Cardiology consult team to sign off.

## 2024-09-22 PROBLEM — N18.9 CHRONIC KIDNEY DISEASE, UNSPECIFIED CKD STAGE: Status: ACTIVE | Noted: 2024-09-22

## 2024-09-22 PROBLEM — I42.2 HYPERTROPHIC CARDIOMYOPATHY: Status: ACTIVE | Noted: 2024-09-22

## 2024-10-07 ENCOUNTER — APPOINTMENT (OUTPATIENT)
Dept: ELECTROPHYSIOLOGY | Facility: CLINIC | Age: 34
End: 2024-10-07
Payer: MEDICAID

## 2024-10-07 ENCOUNTER — APPOINTMENT (OUTPATIENT)
Dept: CARDIOLOGY | Facility: CLINIC | Age: 34
End: 2024-10-07
Payer: MEDICAID

## 2024-10-07 VITALS
DIASTOLIC BLOOD PRESSURE: 90 MMHG | WEIGHT: 205 LBS | BODY MASS INDEX: 27.77 KG/M2 | SYSTOLIC BLOOD PRESSURE: 162 MMHG | HEIGHT: 72 IN | HEART RATE: 71 BPM

## 2024-10-07 DIAGNOSIS — I10 ESSENTIAL (PRIMARY) HYPERTENSION: ICD-10-CM

## 2024-10-07 DIAGNOSIS — I42.2 OTHER HYPERTROPHIC CARDIOMYOPATHY: ICD-10-CM

## 2024-10-07 PROCEDURE — 99204 OFFICE O/P NEW MOD 45 MIN: CPT

## 2024-10-07 PROCEDURE — G2211 COMPLEX E/M VISIT ADD ON: CPT | Mod: NC

## 2024-10-07 PROCEDURE — ZZZZZ: CPT

## 2024-10-07 PROCEDURE — 93000 ELECTROCARDIOGRAM COMPLETE: CPT

## 2024-11-11 ENCOUNTER — APPOINTMENT (OUTPATIENT)
Dept: ORTHOPEDIC SURGERY | Facility: CLINIC | Age: 34
End: 2024-11-11
Payer: MEDICAID

## 2024-11-11 DIAGNOSIS — S42.301A UNSPECIFIED FRACTURE OF SHAFT OF HUMERUS, RIGHT ARM, INITIAL ENCOUNTER FOR CLOSED FRACTURE: ICD-10-CM

## 2024-11-11 PROCEDURE — 99212 OFFICE O/P EST SF 10 MIN: CPT

## 2024-11-11 PROCEDURE — 73060 X-RAY EXAM OF HUMERUS: CPT | Mod: RT

## 2024-11-18 ENCOUNTER — APPOINTMENT (OUTPATIENT)
Dept: ELECTROPHYSIOLOGY | Facility: CLINIC | Age: 34
End: 2024-11-18
Payer: MEDICAID

## 2024-11-18 VITALS
WEIGHT: 210 LBS | HEIGHT: 72 IN | BODY MASS INDEX: 28.44 KG/M2 | HEART RATE: 80 BPM | DIASTOLIC BLOOD PRESSURE: 90 MMHG | SYSTOLIC BLOOD PRESSURE: 142 MMHG

## 2024-11-18 DIAGNOSIS — I10 ESSENTIAL (PRIMARY) HYPERTENSION: ICD-10-CM

## 2024-11-18 DIAGNOSIS — I42.2 OTHER HYPERTROPHIC CARDIOMYOPATHY: ICD-10-CM

## 2024-11-18 PROCEDURE — G2211 COMPLEX E/M VISIT ADD ON: CPT | Mod: NC

## 2024-11-18 PROCEDURE — 93000 ELECTROCARDIOGRAM COMPLETE: CPT | Mod: 59

## 2024-11-18 PROCEDURE — 99213 OFFICE O/P EST LOW 20 MIN: CPT

## 2024-11-18 PROCEDURE — 93228 REMOTE 30 DAY ECG REV/REPORT: CPT

## 2025-01-06 ENCOUNTER — APPOINTMENT (OUTPATIENT)
Dept: ELECTROPHYSIOLOGY | Facility: CLINIC | Age: 35
End: 2025-01-06
Payer: MEDICAID

## 2025-01-06 VITALS
DIASTOLIC BLOOD PRESSURE: 70 MMHG | SYSTOLIC BLOOD PRESSURE: 117 MMHG | HEART RATE: 79 BPM | WEIGHT: 214 LBS | HEIGHT: 72 IN | BODY MASS INDEX: 28.99 KG/M2

## 2025-01-06 DIAGNOSIS — I42.2 OTHER HYPERTROPHIC CARDIOMYOPATHY: ICD-10-CM

## 2025-01-06 DIAGNOSIS — I10 ESSENTIAL (PRIMARY) HYPERTENSION: ICD-10-CM

## 2025-01-06 PROCEDURE — 99213 OFFICE O/P EST LOW 20 MIN: CPT

## 2025-01-06 PROCEDURE — 93000 ELECTROCARDIOGRAM COMPLETE: CPT

## 2025-01-06 PROCEDURE — G2211 COMPLEX E/M VISIT ADD ON: CPT | Mod: NC

## 2025-01-23 ENCOUNTER — NON-APPOINTMENT (OUTPATIENT)
Age: 35
End: 2025-01-23

## 2025-01-23 ENCOUNTER — RESULT CHARGE (OUTPATIENT)
Age: 35
End: 2025-01-23

## 2025-01-23 ENCOUNTER — APPOINTMENT (OUTPATIENT)
Dept: CARDIOLOGY | Facility: CLINIC | Age: 35
End: 2025-01-23
Payer: MEDICAID

## 2025-01-23 VITALS
BODY MASS INDEX: 29.53 KG/M2 | HEART RATE: 71 BPM | HEIGHT: 72 IN | SYSTOLIC BLOOD PRESSURE: 127 MMHG | DIASTOLIC BLOOD PRESSURE: 82 MMHG | WEIGHT: 218 LBS

## 2025-01-23 DIAGNOSIS — I42.2 OTHER HYPERTROPHIC CARDIOMYOPATHY: ICD-10-CM

## 2025-01-23 DIAGNOSIS — I11.9 HYPERTENSIVE HEART DISEASE W/OUT HEART FAILURE: ICD-10-CM

## 2025-01-23 DIAGNOSIS — N18.9 CHRONIC KIDNEY DISEASE, UNSPECIFIED: ICD-10-CM

## 2025-01-23 DIAGNOSIS — I10 ESSENTIAL (PRIMARY) HYPERTENSION: ICD-10-CM

## 2025-01-23 PROCEDURE — G2211 COMPLEX E/M VISIT ADD ON: CPT | Mod: NC

## 2025-01-23 PROCEDURE — 99214 OFFICE O/P EST MOD 30 MIN: CPT

## 2025-01-23 PROCEDURE — 93000 ELECTROCARDIOGRAM COMPLETE: CPT

## 2025-01-23 RX ORDER — NIFEDIPINE 60 MG/1
60 TABLET, EXTENDED RELEASE ORAL TWICE DAILY
Qty: 180 | Refills: 3 | Status: ACTIVE | COMMUNITY
Start: 1900-01-01 | End: 1900-01-01

## 2025-01-23 RX ORDER — LABETALOL HYDROCHLORIDE 300 MG/1
300 TABLET, FILM COATED ORAL
Refills: 0 | Status: ACTIVE | COMMUNITY

## 2025-01-27 ENCOUNTER — APPOINTMENT (OUTPATIENT)
Dept: ORTHOPEDIC SURGERY | Facility: CLINIC | Age: 35
End: 2025-01-27
Payer: MEDICAID

## 2025-01-27 DIAGNOSIS — S42.301A UNSPECIFIED FRACTURE OF SHAFT OF HUMERUS, RIGHT ARM, INITIAL ENCOUNTER FOR CLOSED FRACTURE: ICD-10-CM

## 2025-01-27 PROCEDURE — 73060 X-RAY EXAM OF HUMERUS: CPT | Mod: RT

## 2025-01-27 PROCEDURE — 99212 OFFICE O/P EST SF 10 MIN: CPT

## 2025-08-18 ENCOUNTER — APPOINTMENT (OUTPATIENT)
Dept: CARDIOLOGY | Facility: CLINIC | Age: 35
End: 2025-08-18
Payer: COMMERCIAL

## 2025-08-18 PROCEDURE — 93306 TTE W/DOPPLER COMPLETE: CPT

## 2025-08-25 ENCOUNTER — APPOINTMENT (OUTPATIENT)
Dept: CARDIOLOGY | Facility: CLINIC | Age: 35
End: 2025-08-25
Payer: COMMERCIAL

## 2025-08-25 ENCOUNTER — RESULT CHARGE (OUTPATIENT)
Age: 35
End: 2025-08-25

## 2025-08-25 VITALS
HEART RATE: 70 BPM | BODY MASS INDEX: 31.97 KG/M2 | WEIGHT: 236 LBS | HEIGHT: 72 IN | SYSTOLIC BLOOD PRESSURE: 133 MMHG | DIASTOLIC BLOOD PRESSURE: 89 MMHG

## 2025-08-25 DIAGNOSIS — I11.9 HYPERTENSIVE HEART DISEASE W/OUT HEART FAILURE: ICD-10-CM

## 2025-08-25 DIAGNOSIS — N18.9 CHRONIC KIDNEY DISEASE, UNSPECIFIED: ICD-10-CM

## 2025-08-25 DIAGNOSIS — I10 ESSENTIAL (PRIMARY) HYPERTENSION: ICD-10-CM

## 2025-08-25 DIAGNOSIS — R94.31 ABNORMAL ELECTROCARDIOGRAM [ECG] [EKG]: ICD-10-CM

## 2025-08-25 DIAGNOSIS — I42.2 OTHER HYPERTROPHIC CARDIOMYOPATHY: ICD-10-CM

## 2025-08-25 PROCEDURE — 99214 OFFICE O/P EST MOD 30 MIN: CPT

## 2025-08-25 PROCEDURE — 93000 ELECTROCARDIOGRAM COMPLETE: CPT

## 2025-08-25 PROCEDURE — G2211 COMPLEX E/M VISIT ADD ON: CPT | Mod: NC

## 2025-09-08 ENCOUNTER — APPOINTMENT (OUTPATIENT)
Dept: ELECTROPHYSIOLOGY | Facility: CLINIC | Age: 35
End: 2025-09-08